# Patient Record
Sex: MALE | Race: ASIAN | Employment: FULL TIME | URBAN - METROPOLITAN AREA
[De-identification: names, ages, dates, MRNs, and addresses within clinical notes are randomized per-mention and may not be internally consistent; named-entity substitution may affect disease eponyms.]

---

## 2017-05-19 PROCEDURE — 87086 URINE CULTURE/COLONY COUNT: CPT | Performed by: FAMILY MEDICINE

## 2017-05-19 PROCEDURE — 81001 URINALYSIS AUTO W/SCOPE: CPT | Performed by: FAMILY MEDICINE

## 2018-03-21 ENCOUNTER — HOSPITAL ENCOUNTER (OUTPATIENT)
Age: 48
Discharge: HOME | End: 2018-03-21
Attending: FAMILY MEDICINE
Payer: COMMERCIAL

## 2018-03-21 VITALS
RESPIRATION RATE: 14 BRPM | BODY MASS INDEX: 26.03 KG/M2 | WEIGHT: 162 LBS | OXYGEN SATURATION: 97 % | HEART RATE: 91 BPM | SYSTOLIC BLOOD PRESSURE: 126 MMHG | HEIGHT: 66 IN | DIASTOLIC BLOOD PRESSURE: 85 MMHG | TEMPERATURE: 98 F

## 2018-03-21 DIAGNOSIS — K52.9 GASTROENTERITIS: Primary | ICD-10-CM

## 2018-03-21 LAB
#LYMPHOCYTE IC: 0.6 X10ˆ3/UL (ref 0.9–3.2)
#MXD IC: 0.6 X10ˆ3/UL (ref 0.1–1)
#NEUTROPHIL IC: 11.6 X10ˆ3/UL (ref 1.3–6.7)
CREAT SERPL-MCNC: 0.8 MG/DL (ref 0.7–1.3)
GLUCOSE BLD-MCNC: 246 MG/DL (ref 65–99)
GLUCOSE BLD-MCNC: 303 MG/DL (ref 70–99)
HCT IC: 45.6 % (ref 37–54)
HGB IC: 15.5 G/DL (ref 13–17)
ISTAT BLOOD GAS TCO2: 27 MMOL/L (ref 22–32)
ISTAT BUN: 20 MG/DL (ref 8–20)
ISTAT CHLORIDE: 100 MMOL/L (ref 101–111)
ISTAT HEMATOCRIT: 48 % (ref 37–53)
ISTAT IONIZED CALCIUM: 1.13 MMOL/L (ref 1.12–1.32)
ISTAT POTASSIUM: 3.6 MMOL/L (ref 3.6–5.1)
ISTAT SODIUM: 141 MMOL/L (ref 136–144)
LYMPHOCYTES NFR BLD AUTO: 4.8 %
MCH IC: 30.5 PG (ref 27–33.2)
MCHC IC: 34 G/DL (ref 31–37)
MCV IC: 89.8 FL (ref 80–99)
MIXED CELL %: 4.3 %
NEUTROPHILS NFR BLD AUTO: 90.9 %
PLT IC: 308 X10ˆ3/UL (ref 150–450)
POCT BILIRUBIN URINE: NEGATIVE
POCT BLOOD URINE: NEGATIVE
POCT GLUCOSE URINE: 500 MG/DL
POCT KETONE URINE: 80 MG/DL
POCT LEUKOCYTE ESTERASE URINE: NEGATIVE
POCT NITRITE URINE: NEGATIVE
POCT PH URINE: 5.5 (ref 5–8)
POCT PROTEIN URINE: 30 MG/DL
POCT SPECIFIC GRAVITY URINE: 1.02
POCT URINE CLARITY: CLEAR
POCT UROBILINOGEN URINE: 0.2 MG/DL
RBC IC: 5.08 X10ˆ6/UL (ref 4.3–5.7)
WBC IC: 12.8 X10ˆ3/UL (ref 4–13)

## 2018-03-21 PROCEDURE — 96360 HYDRATION IV INFUSION INIT: CPT

## 2018-03-21 PROCEDURE — 80047 BASIC METABLC PNL IONIZED CA: CPT

## 2018-03-21 PROCEDURE — 81002 URINALYSIS NONAUTO W/O SCOPE: CPT | Performed by: FAMILY MEDICINE

## 2018-03-21 PROCEDURE — 82962 GLUCOSE BLOOD TEST: CPT

## 2018-03-21 PROCEDURE — 99204 OFFICE O/P NEW MOD 45 MIN: CPT

## 2018-03-21 PROCEDURE — 85025 COMPLETE CBC W/AUTO DIFF WBC: CPT | Performed by: FAMILY MEDICINE

## 2018-03-21 PROCEDURE — 99214 OFFICE O/P EST MOD 30 MIN: CPT

## 2018-03-21 RX ORDER — ONDANSETRON 4 MG/1
4 TABLET, ORALLY DISINTEGRATING ORAL ONCE
Status: COMPLETED | OUTPATIENT
Start: 2018-03-21 | End: 2018-03-21

## 2018-03-21 RX ORDER — ONDANSETRON 4 MG/1
4 TABLET, ORALLY DISINTEGRATING ORAL EVERY 4 HOURS PRN
Qty: 10 TABLET | Refills: 0 | Status: SHIPPED | OUTPATIENT
Start: 2018-03-21 | End: 2018-03-28

## 2018-03-21 RX ORDER — SODIUM CHLORIDE 9 MG/ML
1000 INJECTION, SOLUTION INTRAVENOUS ONCE
Status: COMPLETED | OUTPATIENT
Start: 2018-03-21 | End: 2018-03-21

## 2018-03-21 NOTE — ED PROVIDER NOTES
Patient Seen in: [address]    History   Patient presents with:  Nausea/Vomiting/Diarrhea (gastrointestinal)    Stated Complaint: vomiting;headache;diarrhea     HPI    19-year-old male presents for nausea, vomiting and diar well-nourished. HENT:   Head: Normocephalic and atraumatic.    Right Ear: Tympanic membrane, external ear and ear canal normal.   Left Ear: Tympanic membrane, external ear and ear canal normal.   Nose: Nose normal.   Mouth/Throat: Oropharynx is clear and diagnosis)    Disposition:  Discharge  3/21/2018 11:08 am    Follow-up:  Rufus Castellanos MD  [address] [place] [address] [phone]              Medications Prescribed:  Discharge Medication List as of 3/21/2018 11:10 AM    START taking th

## 2019-01-19 PROCEDURE — 82570 ASSAY OF URINE CREATININE: CPT | Performed by: INTERNAL MEDICINE

## 2019-01-19 PROCEDURE — 82043 UR ALBUMIN QUANTITATIVE: CPT | Performed by: INTERNAL MEDICINE

## 2019-01-19 PROCEDURE — 84681 ASSAY OF C-PEPTIDE: CPT | Performed by: INTERNAL MEDICINE

## 2020-12-29 PROBLEM — M75.102 ROTATOR CUFF SYNDROME OF LEFT SHOULDER: Status: ACTIVE | Noted: 2020-12-29

## 2021-01-27 ENCOUNTER — LAB ENCOUNTER (OUTPATIENT)
Dept: LAB | Facility: HOSPITAL | Age: 51
End: 2021-01-27
Attending: INTERNAL MEDICINE
Payer: COMMERCIAL

## 2021-01-27 ENCOUNTER — OFFICE VISIT (OUTPATIENT)
Dept: ENDOCRINOLOGY CLINIC | Facility: CLINIC | Age: 51
End: 2021-01-27
Payer: COMMERCIAL

## 2021-01-27 VITALS
HEART RATE: 86 BPM | DIASTOLIC BLOOD PRESSURE: 88 MMHG | WEIGHT: 167 LBS | BODY MASS INDEX: 27 KG/M2 | SYSTOLIC BLOOD PRESSURE: 130 MMHG

## 2021-01-27 DIAGNOSIS — E11.9 TYPE 2 DIABETES MELLITUS WITHOUT COMPLICATION, WITHOUT LONG-TERM CURRENT USE OF INSULIN (HCC): ICD-10-CM

## 2021-01-27 DIAGNOSIS — E11.9 TYPE 2 DIABETES MELLITUS WITHOUT COMPLICATION, WITHOUT LONG-TERM CURRENT USE OF INSULIN (HCC): Primary | ICD-10-CM

## 2021-01-27 LAB
ALBUMIN SERPL-MCNC: 3.5 G/DL (ref 3.4–5)
ALBUMIN/GLOB SERPL: 0.8 {RATIO} (ref 1–2)
ALP LIVER SERPL-CCNC: 91 U/L
ALT SERPL-CCNC: 33 U/L
ANION GAP SERPL CALC-SCNC: 6 MMOL/L (ref 0–18)
AST SERPL-CCNC: 14 U/L (ref 15–37)
BILIRUB SERPL-MCNC: 0.8 MG/DL (ref 0.1–2)
BUN BLD-MCNC: 15 MG/DL (ref 7–18)
BUN/CREAT SERPL: 16.5 (ref 10–20)
CALCIUM BLD-MCNC: 9.1 MG/DL (ref 8.5–10.1)
CARTRIDGE LOT#: ABNORMAL NUMERIC
CHLORIDE SERPL-SCNC: 105 MMOL/L (ref 98–112)
CHOLEST SMN-MCNC: 206 MG/DL (ref ?–200)
CO2 SERPL-SCNC: 29 MMOL/L (ref 21–32)
CREAT BLD-MCNC: 0.91 MG/DL
CREAT UR-SCNC: 75.3 MG/DL
GLOBULIN PLAS-MCNC: 4.4 G/DL (ref 2.8–4.4)
GLUCOSE BLD-MCNC: 268 MG/DL (ref 70–99)
GLUCOSE BLOOD: 336
HDLC SERPL-MCNC: 47 MG/DL (ref 40–59)
HEMOGLOBIN A1C: 10.1 % (ref 4.3–5.6)
LDLC SERPL CALC-MCNC: 119 MG/DL (ref ?–100)
M PROTEIN MFR SERPL ELPH: 7.9 G/DL (ref 6.4–8.2)
MICROALBUMIN UR-MCNC: 0.56 MG/DL
MICROALBUMIN/CREAT 24H UR-RTO: 7.4 UG/MG (ref ?–30)
NONHDLC SERPL-MCNC: 159 MG/DL (ref ?–130)
OSMOLALITY SERPL CALC.SUM OF ELEC: 300 MOSM/KG (ref 275–295)
PATIENT FASTING Y/N/NP: NO
PATIENT FASTING Y/N/NP: NO
POTASSIUM SERPL-SCNC: 3.8 MMOL/L (ref 3.5–5.1)
SODIUM SERPL-SCNC: 140 MMOL/L (ref 136–145)
T4 FREE SERPL-MCNC: 1 NG/DL (ref 0.8–1.7)
TEST STRIP LOT #: NORMAL NUMERIC
TRIGL SERPL-MCNC: 200 MG/DL (ref 30–149)
TSI SER-ACNC: 0.5 MIU/ML (ref 0.36–3.74)
VLDLC SERPL CALC-MCNC: 40 MG/DL (ref 0–30)

## 2021-01-27 PROCEDURE — 36415 COLL VENOUS BLD VENIPUNCTURE: CPT

## 2021-01-27 PROCEDURE — 82947 ASSAY GLUCOSE BLOOD QUANT: CPT | Performed by: INTERNAL MEDICINE

## 2021-01-27 PROCEDURE — 80053 COMPREHEN METABOLIC PANEL: CPT

## 2021-01-27 PROCEDURE — 3079F DIAST BP 80-89 MM HG: CPT | Performed by: INTERNAL MEDICINE

## 2021-01-27 PROCEDURE — 3075F SYST BP GE 130 - 139MM HG: CPT | Performed by: INTERNAL MEDICINE

## 2021-01-27 PROCEDURE — 84439 ASSAY OF FREE THYROXINE: CPT

## 2021-01-27 PROCEDURE — 99204 OFFICE O/P NEW MOD 45 MIN: CPT | Performed by: INTERNAL MEDICINE

## 2021-01-27 PROCEDURE — 82570 ASSAY OF URINE CREATININE: CPT

## 2021-01-27 PROCEDURE — 82043 UR ALBUMIN QUANTITATIVE: CPT

## 2021-01-27 PROCEDURE — 83036 HEMOGLOBIN GLYCOSYLATED A1C: CPT | Performed by: INTERNAL MEDICINE

## 2021-01-27 PROCEDURE — 84443 ASSAY THYROID STIM HORMONE: CPT

## 2021-01-27 PROCEDURE — 36416 COLLJ CAPILLARY BLOOD SPEC: CPT | Performed by: INTERNAL MEDICINE

## 2021-01-27 PROCEDURE — 80061 LIPID PANEL: CPT

## 2021-01-27 RX ORDER — METFORMIN HYDROCHLORIDE 500 MG/1
1000 TABLET, EXTENDED RELEASE ORAL 2 TIMES DAILY WITH MEALS
Qty: 120 TABLET | Refills: 1 | Status: ON HOLD | OUTPATIENT
Start: 2021-01-27 | End: 2021-03-01

## 2021-01-27 NOTE — H&P
Name: Chicho Nuñez  Date: 1/27/2021    Referring Physician: referred by his wife    HISTORY OF PRESENT ILLNESS   Chicho Nuñez is a 52year old male who presents for evaluation and management of uncontrolled T2D. He was diagnosed about 6-8 years.  He was margi (BLOOD GLUCOSE TEST) In Vitro Strip, Test once daily, Disp: 100 strip, Rfl: 1  •  Lancets Does not apply Misc, Test once daily, Disp: 100 each, Rfl: 1  •  MetFORMIN HCl 1000 MG Oral Tab, Take 1 tablet (1,000 mg total) by mouth 2 (two) times daily with meal place  Nutritional:  no abnormal weight gain or loss    Lab Data:   Lab Results   Component Value Date     01/19/2019    A1C 10.1 (A) 01/27/2021     Lab Results   Component Value Date     (H) 01/19/2019    BUN 15 01/19/2019    CREATSERUM 0.96 every meal, with lots whole grains, and protein from either plant-based sources, or poultry and fish. Return to clinic in 4 weeks    During the face to face encounter, I spent an additional 20 minutes which were determined for follow-up.  Greater than 5

## 2021-01-28 ENCOUNTER — TELEPHONE (OUTPATIENT)
Dept: ENDOCRINOLOGY CLINIC | Facility: CLINIC | Age: 51
End: 2021-01-28

## 2021-01-28 RX ORDER — ATORVASTATIN CALCIUM 20 MG/1
20 TABLET, FILM COATED ORAL NIGHTLY
Qty: 90 TABLET | Refills: 1 | Status: SHIPPED | OUTPATIENT
Start: 2021-01-28

## 2021-01-28 NOTE — TELEPHONE ENCOUNTER
Hi!  Please call patient and let him know that I have reviewed his blood tests. He does not have any thyroid disease. He does not have any evidence of damage to his kidneys due to the diabetes. I would like to start him on a statin.  I will send a prescript

## 2021-02-25 NOTE — TELEPHONE ENCOUNTER
Second attempt to contact patient. No answer, mailbox full. [employer] message sent and flagged it to alert office if not read by tomorrow 2/26/21. Called pharmacy and spoke to [facility] who confirmed patient picked up atorvastatin on 2/3/21.

## 2021-02-28 ENCOUNTER — HOSPITAL ENCOUNTER (OUTPATIENT)
Age: 51
Discharge: ANOTHER HOSPITAL | End: 2021-02-28
Payer: COMMERCIAL

## 2021-02-28 ENCOUNTER — APPOINTMENT (OUTPATIENT)
Dept: CV DIAGNOSTICS | Facility: HOSPITAL | Age: 51
DRG: 310 | End: 2021-02-28
Attending: INTERNAL MEDICINE
Payer: COMMERCIAL

## 2021-02-28 ENCOUNTER — HOSPITAL ENCOUNTER (INPATIENT)
Facility: HOSPITAL | Age: 51
LOS: 1 days | Discharge: HOME | DRG: 310 | End: 2021-03-01
Attending: EMERGENCY MEDICINE | Admitting: INTERNAL MEDICINE
Payer: COMMERCIAL

## 2021-02-28 ENCOUNTER — APPOINTMENT (OUTPATIENT)
Dept: GENERAL RADIOLOGY | Facility: HOSPITAL | Age: 51
DRG: 310 | End: 2021-02-28
Payer: COMMERCIAL

## 2021-02-28 VITALS
WEIGHT: 160 LBS | SYSTOLIC BLOOD PRESSURE: 125 MMHG | HEART RATE: 150 BPM | TEMPERATURE: 98 F | RESPIRATION RATE: 20 BRPM | DIASTOLIC BLOOD PRESSURE: 75 MMHG | OXYGEN SATURATION: 99 % | BODY MASS INDEX: 26 KG/M2

## 2021-02-28 DIAGNOSIS — R11.2 NAUSEA AND VOMITING IN ADULT: ICD-10-CM

## 2021-02-28 DIAGNOSIS — I48.91 ATRIAL FIBRILLATION WITH RVR (HCC): Primary | ICD-10-CM

## 2021-02-28 DIAGNOSIS — E11.9 TYPE 2 DIABETES MELLITUS WITHOUT COMPLICATION, WITHOUT LONG-TERM CURRENT USE OF INSULIN (HCC): ICD-10-CM

## 2021-02-28 DIAGNOSIS — R53.1 WEAKNESS: ICD-10-CM

## 2021-02-28 DIAGNOSIS — E11.65 TYPE 2 DIABETES MELLITUS WITH HYPERGLYCEMIA, UNSPECIFIED WHETHER LONG TERM INSULIN USE (HCC): ICD-10-CM

## 2021-02-28 DIAGNOSIS — Z20.822 LAB TEST NEGATIVE FOR COVID-19 VIRUS: ICD-10-CM

## 2021-02-28 DIAGNOSIS — R55 SYNCOPE, NEAR: ICD-10-CM

## 2021-02-28 PROBLEM — R73.9 HYPERGLYCEMIA: Status: ACTIVE | Noted: 2021-02-28

## 2021-02-28 LAB
#MXD IC: 0.3 X10ˆ3/UL (ref 0.1–1)
ALBUMIN SERPL-MCNC: 3.6 G/DL (ref 3.4–5)
ALBUMIN/GLOB SERPL: 0.9 {RATIO} (ref 1–2)
ALP LIVER SERPL-CCNC: 88 U/L
ALT SERPL-CCNC: 31 U/L
ANION GAP SERPL CALC-SCNC: 8 MMOL/L (ref 0–18)
APTT PPP: 27.7 SECONDS (ref 25.4–36.1)
AST SERPL-CCNC: 14 U/L (ref 15–37)
ATRIAL RATE: 153 BPM
ATRIAL RATE: 163 BPM
BASOPHILS # BLD AUTO: 0.03 X10(3) UL (ref 0–0.2)
BASOPHILS NFR BLD AUTO: 0.3 %
BILIRUB SERPL-MCNC: 0.9 MG/DL (ref 0.1–2)
BUN BLD-MCNC: 13 MG/DL (ref 7–18)
BUN/CREAT SERPL: 14.6 (ref 10–20)
CALCIUM BLD-MCNC: 9.1 MG/DL (ref 8.5–10.1)
CHLORIDE SERPL-SCNC: 105 MMOL/L (ref 98–112)
CO2 SERPL-SCNC: 27 MMOL/L (ref 21–32)
CREAT BLD-MCNC: 0.89 MG/DL
DEPRECATED RDW RBC AUTO: 37.5 FL (ref 35.1–46.3)
EOSINOPHIL # BLD AUTO: 0 X10(3) UL (ref 0–0.7)
EOSINOPHIL NFR BLD AUTO: 0 %
ERYTHROCYTE [DISTWIDTH] IN BLOOD BY AUTOMATED COUNT: 11.9 % (ref 11–15)
GLOBULIN PLAS-MCNC: 4 G/DL (ref 2.8–4.4)
GLUCOSE BLD-MCNC: 205 MG/DL (ref 70–99)
GLUCOSE BLD-MCNC: 237 MG/DL (ref 70–99)
GLUCOSE BLD-MCNC: 273 MG/DL (ref 70–99)
GLUCOSE BLD-MCNC: 286 MG/DL (ref 70–99)
HAV IGM SER QL: 2.1 MG/DL (ref 1.6–2.6)
HCT VFR BLD AUTO: 44.7 %
HCT VFR BLD AUTO: 46.6 %
HGB BLD-MCNC: 15.1 G/DL
HGB BLD-MCNC: 15.3 G/DL
IMM GRANULOCYTES # BLD AUTO: 0.02 X10(3) UL (ref 0–1)
IMM GRANULOCYTES NFR BLD: 0.2 %
INR BLD: 0.94 (ref 0.89–1.11)
LYMPHOCYTES # BLD AUTO: 1.49 X10(3) UL (ref 1–4)
LYMPHOCYTES # BLD AUTO: 2.2 X10ˆ3/UL (ref 1–4)
LYMPHOCYTES NFR BLD AUTO: 16.4 %
LYMPHOCYTES NFR BLD AUTO: 22 %
M PROTEIN MFR SERPL ELPH: 7.6 G/DL (ref 6.4–8.2)
MCH RBC QN AUTO: 29 PG (ref 26–34)
MCH RBC QN AUTO: 29.7 PG (ref 26–34)
MCHC RBC AUTO-ENTMCNC: 32.4 G/DL (ref 31–37)
MCHC RBC AUTO-ENTMCNC: 34.2 G/DL (ref 31–37)
MCV RBC AUTO: 86.6 FL
MCV RBC AUTO: 89.6 FL (ref 80–100)
MIXED CELL %: 3 %
MONOCYTES # BLD AUTO: 0.31 X10(3) UL (ref 0.1–1)
MONOCYTES NFR BLD AUTO: 3.4 %
NEUTROPHILS # BLD AUTO: 7.21 X10 (3) UL (ref 1.5–7.7)
NEUTROPHILS # BLD AUTO: 7.21 X10(3) UL (ref 1.5–7.7)
NEUTROPHILS # BLD AUTO: 7.5 X10ˆ3/UL (ref 1.5–7.7)
NEUTROPHILS NFR BLD AUTO: 75 %
NEUTROPHILS NFR BLD AUTO: 79.7 %
OSMOLALITY SERPL CALC.SUM OF ELEC: 300 MOSM/KG (ref 275–295)
PLATELET # BLD AUTO: 334 10(3)UL (ref 150–450)
PLATELET # BLD AUTO: 363 X10ˆ3/UL (ref 150–450)
POTASSIUM SERPL-SCNC: 4 MMOL/L (ref 3.5–5.1)
PSA SERPL DL<=0.01 NG/ML-MCNC: 12.9 SECONDS (ref 12.4–14.6)
Q-T INTERVAL: 296 MS
Q-T INTERVAL: 302 MS
QRS DURATION: 86 MS
QRS DURATION: 92 MS
QTC CALCULATION (BEZET): 469 MS
QTC CALCULATION (BEZET): 483 MS
R AXIS: 25 DEGREES
R AXIS: 26 DEGREES
RBC # BLD AUTO: 5.16 X10(6)UL
RBC # BLD AUTO: 5.2 X10ˆ6/UL
SARS-COV-2 RNA RESP QL NAA+PROBE: NOT DETECTED
SODIUM SERPL-SCNC: 140 MMOL/L (ref 136–145)
T AXIS: -84 DEGREES
T AXIS: 246 DEGREES
TROPONIN I SERPL-MCNC: <0.045 NG/ML (ref ?–0.04)
VENTRICULAR RATE: 151 BPM
VENTRICULAR RATE: 154 BPM
WBC # BLD AUTO: 10 X10ˆ3/UL (ref 4–11)
WBC # BLD AUTO: 9.1 X10(3) UL (ref 4–11)

## 2021-02-28 PROCEDURE — 96365 THER/PROPH/DIAG IV INF INIT: CPT

## 2021-02-28 PROCEDURE — 93000 ELECTROCARDIOGRAM COMPLETE: CPT | Performed by: PHYSICIAN ASSISTANT

## 2021-02-28 PROCEDURE — 82962 GLUCOSE BLOOD TEST: CPT

## 2021-02-28 PROCEDURE — 80053 COMPREHEN METABOLIC PANEL: CPT | Performed by: EMERGENCY MEDICINE

## 2021-02-28 PROCEDURE — U0002 COVID-19 LAB TEST NON-CDC: HCPCS | Performed by: PHYSICIAN ASSISTANT

## 2021-02-28 PROCEDURE — 85610 PROTHROMBIN TIME: CPT | Performed by: EMERGENCY MEDICINE

## 2021-02-28 PROCEDURE — 93306 TTE W/DOPPLER COMPLETE: CPT | Performed by: INTERNAL MEDICINE

## 2021-02-28 PROCEDURE — 93010 ELECTROCARDIOGRAM REPORT: CPT

## 2021-02-28 PROCEDURE — 84484 ASSAY OF TROPONIN QUANT: CPT | Performed by: EMERGENCY MEDICINE

## 2021-02-28 PROCEDURE — 36415 COLL VENOUS BLD VENIPUNCTURE: CPT | Performed by: PHYSICIAN ASSISTANT

## 2021-02-28 PROCEDURE — 71045 X-RAY EXAM CHEST 1 VIEW: CPT | Performed by: EMERGENCY MEDICINE

## 2021-02-28 PROCEDURE — 99291 CRITICAL CARE FIRST HOUR: CPT

## 2021-02-28 PROCEDURE — 96374 THER/PROPH/DIAG INJ IV PUSH: CPT | Performed by: PHYSICIAN ASSISTANT

## 2021-02-28 PROCEDURE — 85025 COMPLETE CBC W/AUTO DIFF WBC: CPT | Performed by: PHYSICIAN ASSISTANT

## 2021-02-28 PROCEDURE — 96375 TX/PRO/DX INJ NEW DRUG ADDON: CPT

## 2021-02-28 PROCEDURE — 82962 GLUCOSE BLOOD TEST: CPT | Performed by: PHYSICIAN ASSISTANT

## 2021-02-28 PROCEDURE — 85730 THROMBOPLASTIN TIME PARTIAL: CPT | Performed by: EMERGENCY MEDICINE

## 2021-02-28 PROCEDURE — 96366 THER/PROPH/DIAG IV INF ADDON: CPT

## 2021-02-28 PROCEDURE — 93005 ELECTROCARDIOGRAM TRACING: CPT

## 2021-02-28 PROCEDURE — 83735 ASSAY OF MAGNESIUM: CPT | Performed by: HOSPITALIST

## 2021-02-28 PROCEDURE — 99205 OFFICE O/P NEW HI 60 MIN: CPT | Performed by: PHYSICIAN ASSISTANT

## 2021-02-28 RX ORDER — METOPROLOL TARTRATE 50 MG/1
50 TABLET, FILM COATED ORAL ONCE
Status: COMPLETED | OUTPATIENT
Start: 2021-02-28 | End: 2021-02-28

## 2021-02-28 RX ORDER — ACETAMINOPHEN 325 MG/1
650 TABLET ORAL EVERY 6 HOURS PRN
Status: DISCONTINUED | OUTPATIENT
Start: 2021-02-28 | End: 2021-03-01

## 2021-02-28 RX ORDER — SODIUM CHLORIDE 9 MG/ML
1000 INJECTION, SOLUTION INTRAVENOUS ONCE
Status: COMPLETED | OUTPATIENT
Start: 2021-02-28 | End: 2021-02-28

## 2021-02-28 RX ORDER — METOPROLOL SUCCINATE 50 MG/1
50 TABLET, EXTENDED RELEASE ORAL ONCE
Status: DISCONTINUED | OUTPATIENT
Start: 2021-02-28 | End: 2021-02-28

## 2021-02-28 RX ORDER — ATORVASTATIN CALCIUM 20 MG/1
20 TABLET, FILM COATED ORAL NIGHTLY
Status: DISCONTINUED | OUTPATIENT
Start: 2021-02-28 | End: 2021-03-01

## 2021-02-28 RX ORDER — SODIUM CHLORIDE 9 MG/ML
INJECTION, SOLUTION INTRAVENOUS CONTINUOUS
Status: DISCONTINUED | OUTPATIENT
Start: 2021-02-28 | End: 2021-03-01

## 2021-02-28 RX ORDER — ONDANSETRON 2 MG/ML
4 INJECTION INTRAMUSCULAR; INTRAVENOUS ONCE
Status: COMPLETED | OUTPATIENT
Start: 2021-02-28 | End: 2021-02-28

## 2021-02-28 RX ORDER — MELATONIN
3 NIGHTLY
Status: DISCONTINUED | OUTPATIENT
Start: 2021-02-28 | End: 2021-03-01

## 2021-02-28 RX ORDER — METOPROLOL TARTRATE 5 MG/5ML
5 INJECTION INTRAVENOUS ONCE
Status: COMPLETED | OUTPATIENT
Start: 2021-02-28 | End: 2021-02-28

## 2021-02-28 NOTE — ED INITIAL ASSESSMENT (HPI)
Pt is here for vomiting and diarrhea with syncope since yesterday. Pt denies any chest pain or shortness of breath. Pt denies any abd pain or back pain.  Pt is currently vomiting in the IC on arrival.

## 2021-02-28 NOTE — ED PROVIDER NOTES
Patient Seen in: Immediate [address] [place] [address]      History   Patient presents with:  Nausea/Vomiting/Diarrhea  Syncope    Stated Complaint: nauseous, no appetite, passed out yesterday x 2 days    HPI/Subjective:   HPI    82-year-old male with a histor Current:/75   Pulse (!) 150   Temp 98 °F (36.7 °C)   Resp 20   Wt 72.6 kg   SpO2 99%   BMI 25.82 kg/m²         Physical Exam  Vitals signs and nursing note reviewed. Constitutional:       General: He is in acute distress.       Appearance: He MDM      Discussed with and evaluated the patient with Dr. Carin Rice who agrees with assessment and plan.     Newly brought back to the room IV line established, patient is noted to be in A. fib with RVR with a heart rate in the upper 160s, placed on cardi

## 2021-02-28 NOTE — CONSULTS
[facility] Cardiology Consultation    Wade Rendon Patient Status:  Emergency    1970 MRN [id number]   Location [address] Attending Maile Severino MD   Hosp Day # 0 PCP Merle Gallego MD     Reason for Consultation:  Afib with RVR Weights  02/28/21 0925 : 160 lb (72.6 kg)  02/28/21 0857 : 160 lb (72.6 kg)  02/23/21 1530 : 162 lb 12.8 oz (73.8 kg)          General: No apparent distress, Awake, Alert  HEENT: No focal deficits. Anicteric, NACAT  Neck: supple.  JVP normal  Cardiac: Irreg,

## 2021-02-28 NOTE — ED PROVIDER NOTES
Patient Seen in: [facility] Emergency Department      History   Patient presents with:  Arrythmia/Palpitations    Stated Complaint: new afib    HPI/Subjective:   HPI    Lightheadedness, an episode of near syncope, palpitations, all started around 1: Normocephalic and atraumatic. Eyes: EOM are normal. Pupils are equal, round, and reactive to light. Neck: Normal range of motion. Neck supple. No JVD present.      Cardiovascular: Very tachycardic, irregularly irregular but adequate peripheral perfusi Slight hyperglycemia. Cardizem drip was seemingly ineffective, I did give him 5 metoprolol which did lower the heart rate somewhat.      Discussed with Dr. Madison Cruz, will see the patient and possibly order some oral beta-blocker, will hold off on additio

## 2021-02-28 NOTE — ED NOTES
Pt to be transferred to [facility] ER for further care by 911 ambulance. Family at the bedside.

## 2021-02-28 NOTE — PROGRESS NOTES
NURSING ADMISSION NOTE      Patient admitted via Cart  Oriented to room. Safety precautions initiated. Bed in low position. Call light in reach. Pt admitted from ER, arrived on cart and ambulated to bed. C/o light headedness and dizziness.  Tele

## 2021-02-28 NOTE — ED INITIAL ASSESSMENT (HPI)
Patient presents from immediate care for further evaluation of new onset afib. Patient reports that yesterday he started feeling nauseated and vomited. Felt near syncopal and went to [facility] [place] today. His heart rate is 130-140s.  Denies chest pain, states he doesn'

## 2021-02-28 NOTE — H&P
[facility] Hospitalist History and Physical      Patient presents with:  Arrythmia/Palpitations       PCP: Azeb Murry MD      History of Present Illness: Patient is a 54year old male with PMH sig for HTN, HLD, and DM II who presented to the ED for further ev (30 mg total) by mouth daily. , Disp: 90 tablet, Rfl: 1    •  Blood Glucose Monitoring Suppl (BLOOD GLUCOSE MONITOR SYSTEM) W/DEVICE Does not apply Kit, Test once daily, Disp: 1 kit, Rfl: 1    •  Glucose Blood (BLOOD GLUCOSE TEST) In Vitro Strip, Test once personally reviewed. Radiology: Xr Chest Ap Portable  (cpt=71045)    Result Date: 2/28/2021  PROCEDURE:  XR CHEST AP PORTABLE  (CPT=71045)  TECHNIQUE:  AP chest radiograph was obtained. COMPARISON:  None.   INDICATIONS:  new afib  PATIENT STATED HISTO

## 2021-03-01 ENCOUNTER — APPOINTMENT (OUTPATIENT)
Dept: CT IMAGING | Facility: HOSPITAL | Age: 51
DRG: 310 | End: 2021-03-01
Attending: INTERNAL MEDICINE
Payer: COMMERCIAL

## 2021-03-01 VITALS
DIASTOLIC BLOOD PRESSURE: 78 MMHG | RESPIRATION RATE: 16 BRPM | WEIGHT: 159.5 LBS | HEART RATE: 80 BPM | TEMPERATURE: 99 F | BODY MASS INDEX: 25.63 KG/M2 | OXYGEN SATURATION: 96 % | SYSTOLIC BLOOD PRESSURE: 118 MMHG | HEIGHT: 66 IN

## 2021-03-01 LAB
ANION GAP SERPL CALC-SCNC: 5 MMOL/L (ref 0–18)
ATRIAL RATE: 72 BPM
BUN BLD-MCNC: 13 MG/DL (ref 7–18)
BUN/CREAT SERPL: 16.9 (ref 10–20)
CALCIUM BLD-MCNC: 8.6 MG/DL (ref 8.5–10.1)
CHLORIDE SERPL-SCNC: 112 MMOL/L (ref 98–112)
CO2 SERPL-SCNC: 26 MMOL/L (ref 21–32)
CREAT BLD-MCNC: 0.77 MG/DL
GLUCOSE BLD-MCNC: 196 MG/DL (ref 70–99)
GLUCOSE BLD-MCNC: 214 MG/DL (ref 70–99)
GLUCOSE BLD-MCNC: 219 MG/DL (ref 70–99)
OSMOLALITY SERPL CALC.SUM OF ELEC: 303 MOSM/KG (ref 275–295)
P AXIS: 27 DEGREES
P-R INTERVAL: 136 MS
POTASSIUM SERPL-SCNC: 3.7 MMOL/L (ref 3.5–5.1)
Q-T INTERVAL: 376 MS
QRS DURATION: 88 MS
QTC CALCULATION (BEZET): 411 MS
R AXIS: 4 DEGREES
SODIUM SERPL-SCNC: 143 MMOL/L (ref 136–145)
T AXIS: 8 DEGREES
VENTRICULAR RATE: 72 BPM

## 2021-03-01 PROCEDURE — 82962 GLUCOSE BLOOD TEST: CPT

## 2021-03-01 PROCEDURE — 93005 ELECTROCARDIOGRAM TRACING: CPT

## 2021-03-01 PROCEDURE — 71275 CT ANGIOGRAPHY CHEST: CPT | Performed by: INTERNAL MEDICINE

## 2021-03-01 PROCEDURE — 80048 BASIC METABOLIC PNL TOTAL CA: CPT | Performed by: INTERNAL MEDICINE

## 2021-03-01 PROCEDURE — 93010 ELECTROCARDIOGRAM REPORT: CPT | Performed by: INTERNAL MEDICINE

## 2021-03-01 RX ORDER — ASPIRIN 81 MG/1
81 TABLET ORAL DAILY
Qty: 90 TABLET | Refills: 3 | Status: SHIPPED | OUTPATIENT
Start: 2021-03-01

## 2021-03-01 RX ORDER — METOPROLOL SUCCINATE 25 MG/1
25 TABLET, EXTENDED RELEASE ORAL
Qty: 30 TABLET | Refills: 1 | Status: SHIPPED | OUTPATIENT
Start: 2021-03-02 | End: 2021-03-30

## 2021-03-01 RX ORDER — METFORMIN HYDROCHLORIDE 500 MG/1
1000 TABLET, EXTENDED RELEASE ORAL 2 TIMES DAILY WITH MEALS
Qty: 120 TABLET | Refills: 1 | Status: SHIPPED | COMMUNITY
Start: 2021-03-04

## 2021-03-01 RX ORDER — METOPROLOL SUCCINATE 25 MG/1
25 TABLET, EXTENDED RELEASE ORAL
Status: DISCONTINUED | OUTPATIENT
Start: 2021-03-01 | End: 2021-03-01

## 2021-03-01 NOTE — PLAN OF CARE
Assumed care of patient at 1. Patient resting in bed, AOX4. Oxygenating >90% on RA. A-fib on tele. No anti-coag, SCDs in place. Cardizem gtt infusing per protocol.  Blood pressures running low, adjusting Cardizem prn, PO fluids encouraged, IVF infusing p Progressing  2/28/2021 2302 by Tami Russo RN  Outcome: Progressing     Problem: CARDIOVASCULAR - ADULT  Goal: Maintains optimal cardiac output and hemodynamic stability  Description: INTERVENTIONS:  - Monitor vital signs, rhythm, and trends  - Mon

## 2021-03-01 NOTE — DISCHARGE SUMMARY
General Medicine Discharge Summary     Patient ID:  Naldo Blancas  54year old  1/29/1970    Admit date: 2/28/2021    Discharge date and time: 3/1/2021    Attending Physician: DO MIRIAN Groves total) by mouth nightly. metFORMIN HCl  MG Oral Tablet 24 Hr  Take 2 tablets (1,000 mg total) by mouth 2 (two) times daily with meals.   (hold for 48 hour s/p CTA, can resume on 3/3/21 PM)    Meloxicam 15 MG Oral Tab  Take 1 tablet (15 mg total) by

## 2021-03-01 NOTE — PROGRESS NOTES
03/01/21 0525 03/01/21 0528 03/01/21 0531   Vital Signs   /71 (!) 86/65 98/65   MAP (mmHg) 81 70 72   BP Location Left arm Left arm Left arm   BP Method Automatic Automatic Automatic   Patient Position Lying Sitting Standing

## 2021-03-01 NOTE — PROGRESS NOTES
[facility] Cardiology  Progress Note    Deborra Los Patient Status:  Inpatient    1970 MRN [id number]   [facility] 8NE-A Attending Luisito Birmingham, DO   Hosp Day # 1 PCP Bradly Mcpherson MD     Subjective:   Feels better th rales, rhonchi, or wheezes. Good air movement is noted throughout both lung fields. Abdomen: The abdomen is soft, non-distended, and non-tender. Bowel sounds are present and normoactive. No organomegaly is appreciated.   Extremities:  Extremities do n time  4. Plan discharge home today if above negative and clinically stable. 5.  14 day patch monitor upon discharge and F/U in 3 weeks.     Isaiah Frederick MD  3/1/2021  10:05 AM

## 2021-03-01 NOTE — PLAN OF CARE
Patient was given discharge instructions in regards to medications, s/s to monitor for, follow up appointments, and activity level. All questions were answered. IV was removed. Patient was removed from tele. Patient was escorted off the unit by transport. vital signs, rhythm, and trends  - Monitor for bleeding, hypotension and signs of decreased cardiac output  - Evaluate effectiveness of vasoactive medications to optimize hemodynamic stability  - Monitor arterial and/or venous puncture sites for bleeding a

## 2021-03-01 NOTE — PLAN OF CARE
Patient is alert and oriented x4. NSR on tele. Oxygenation is 100% on RA. L sounds clear. VSS. Patient denies chest pain, shortness of breath, palpitations. Denies pain. Patient is resting comfortably in bed at this time.     POC: monitor heart rate, discha Progressing  3/1/2021 0926 by Radha Merchant RN  Outcome: Progressing  Goal: Absence of cardiac arrhythmias or at baseline  Description: INTERVENTIONS:  - Continuous cardiac monitoring, monitor vital signs, obtain 12 lead EKG if indicated  - Evaluate effe

## 2021-03-02 ENCOUNTER — PATIENT OUTREACH (OUTPATIENT)
Dept: CASE MANAGEMENT | Age: 51
End: 2021-03-02

## 2021-03-02 NOTE — PAYOR COMM NOTE
--------------  ADMISSION REVIEW     Payor: [address]  Subscriber #:  [id number]  Authorization Number: [id number]    Admit date: 2/28/21  Admit time: [phone]       Admitting Physician: Jack Mitchell DO  Attending Physician:  Lauren Carmenwell Mail Alcohol/week: 0.0 standard drinks    Drug use: No             Review of Systems    Positive for stated complaint: new afib  Other systems are as noted in HPI. Constitutional and vital signs reviewed.       All other systems reviewed and negative except ---------                               -----------         ------                     CBC W/ DIFFERENTIAL[[id number]]                              Final result                 Please view results for these tests on the individual orders.    MAGNESIUM   RAIN 2/28/2021 10:10 am    Follow-up:  No follow-up provider specified.         Medications Prescribed:  Current Discharge Medication List       Present on Admission  Date Reviewed: 2/28/2021          ICD-10-CM Noted POA    * (Principal) Atrial fibrillation with No current facility-administered medications on file prior to encounter.      •  atorvastatin 20 MG Oral Tab, Take 1 tablet (20 mg total) by mouth nightly., Disp: 90 tablet, Rfl: 1    •  metFORMIN HCl  MG Oral Tablet 24 Hr, Take 2 tablets (1,000 mg to MSK: Full range of motion in extremities, no clubbing, no cyanosis  Skin: no rashes or lesions  Neuro:  Grossly intact, no focal deficits      Data Review:    LABS:   Lab Results   Component Value Date    WBC 9.1 02/28/2021    HGB 15.3 02/28/2021    HCT 44 # HLD  - cont statin    # Essential HTN  - hold losartan given low BP on cardizem     DVT prophy  - SCD (start AC per cardiology)  Dispo: inpt care. Plan of care d/w pt who agrees. Outpatient records or previous hospital records reviewed.    [facility] hosp Diagnosis Date   • Balanitis     • Constipation     • Hemorrhoids     • Other and unspecified hyperlipidemia     • Type II or unspecified type diabetes mellitus without mention of complication, not stated as uncontrolled              Past Surgical History: 3. HL              Recommend:  Afib with RVR  -discussed new diagnosis and rate vs rhythm control  -[id number] of 1. Discussed thromboembolic risk and recommendation for anticoagulation  -Start IV Cardizem  -Given po metoprolol.  Cont lopressor BID as BP 02/28/21 : 160 lb (72.6 kg)  02/23/21 : 162 lb 12.8 oz (73.8 kg)        Allergies:  No Known Allergies     Physical Exam:   General:  Well-developed / Well-nourished. No acute distress. HEENT:  Normocephalic. Atraumatic. No icterus.   Neck:  There is no    Systolic function was normal. The estimated ejection fraction was 60-65%.      There was no diagnostic evidence for regional wall motion abnormalities.      The study is not technically sufficient to allow evaluation of LV      diastolic function.    2.

## 2021-03-02 NOTE — PROGRESS NOTES
1st attempt DM/Endo apt request    Tanner Mckenzie 16  [address] [place] [address], [address] [place] [address] [phone]    Unable to contact patient. Will try again.

## 2021-03-03 NOTE — PAYOR COMM NOTE
--------------  DISCHARGE REVIEW    Payor: [address]  Subscriber #:  [id number]  Authorization Number: [id number]    Admit date: 2/28/21  Admit time:  9360  Discharge Date: 3/1/2021  2:45 PM     Admitting Physician: Nayla Ingram DO  At (resume on discharge, although metformin will need to be held for 48 hour s/p CTA)  - start ISS with accuchecks   - will need close endo f/u, may need to start insulin as outpt      # HLD  - cont statin     # Essential HTN  - hold losartan for now  - BB ad 30 minutes    Paul Client DO  [facility]      Electronically signed by Mikala Xie DO on 3/1/2021  1:34 PM         REVIEWER COMMENTS

## 2021-03-04 NOTE — PROGRESS NOTES
2nd attempt DM/Endo apt request     Tanner Mckenzie 16    [address], [address] [place] [address] [phone]     Unable to contact patient. Will try again.

## 2021-03-05 NOTE — PROGRESS NOTES
3rd attempt DM/Endo apt request     SundayRolando Jonah 16     [address], [address] [facility] [phone]    Unable to contact patient after multiple attempts. No apt made. Closing encounter.

## 2021-03-09 NOTE — TELEPHONE ENCOUNTER
PT read [facility] message on 2/28/21    RN sent reminder to book f/u w/ SY

## 2021-03-30 PROBLEM — I10 ESSENTIAL HYPERTENSION: Status: ACTIVE | Noted: 2021-03-30

## 2021-08-27 ENCOUNTER — HOSPITAL ENCOUNTER (OUTPATIENT)
Dept: CV DIAGNOSTICS | Facility: HOSPITAL | Age: 51
Discharge: HOME | End: 2021-08-27
Attending: INTERNAL MEDICINE
Payer: COMMERCIAL

## 2021-08-27 DIAGNOSIS — I48.0 PAROXYSMAL ATRIAL FIBRILLATION (HCC): ICD-10-CM

## 2021-08-27 PROCEDURE — 93270 REMOTE 30 DAY ECG REV/REPORT: CPT | Performed by: INTERNAL MEDICINE

## 2021-08-27 PROCEDURE — 93272 ECG/REVIEW INTERPRET ONLY: CPT | Performed by: INTERNAL MEDICINE

## 2021-08-27 PROCEDURE — 93271 ECG/MONITORING AND ANALYSIS: CPT | Performed by: INTERNAL MEDICINE

## 2021-09-03 ENCOUNTER — LAB ENCOUNTER (OUTPATIENT)
Dept: LAB | Facility: HOSPITAL | Age: 51
End: 2021-09-03
Attending: INTERNAL MEDICINE
Payer: COMMERCIAL

## 2021-09-03 DIAGNOSIS — Z11.59 SCREENING FOR VIRAL DISEASE: ICD-10-CM

## 2021-09-03 DIAGNOSIS — Z01.818 PREOP EXAMINATION: ICD-10-CM

## 2021-12-06 ENCOUNTER — OFFICE VISIT (OUTPATIENT)
Dept: SURGERY | Facility: CLINIC | Age: 51
End: 2021-12-06
Payer: COMMERCIAL

## 2021-12-06 VITALS — DIASTOLIC BLOOD PRESSURE: 94 MMHG | HEART RATE: 79 BPM | TEMPERATURE: 97 F | SYSTOLIC BLOOD PRESSURE: 131 MMHG

## 2021-12-06 DIAGNOSIS — L29.0 PRURITUS ANI: ICD-10-CM

## 2021-12-06 DIAGNOSIS — K64.2 PROLAPSED INTERNAL HEMORRHOIDS, GRADE 3: Primary | ICD-10-CM

## 2021-12-06 DIAGNOSIS — K60.2 ANAL FISSURE: ICD-10-CM

## 2021-12-06 PROCEDURE — 3080F DIAST BP >= 90 MM HG: CPT | Performed by: COLON & RECTAL SURGERY

## 2021-12-06 PROCEDURE — 3075F SYST BP GE 130 - 139MM HG: CPT | Performed by: COLON & RECTAL SURGERY

## 2021-12-06 PROCEDURE — 99243 OFF/OP CNSLTJ NEW/EST LOW 30: CPT | Performed by: COLON & RECTAL SURGERY

## 2021-12-06 PROCEDURE — 46600 DIAGNOSTIC ANOSCOPY SPX: CPT | Performed by: COLON & RECTAL SURGERY

## 2021-12-06 NOTE — H&P
New Patient Visit Note       Active Problems      1. Prolapsed internal hemorrhoids, grade 3    2. Anal fissure    3.  Pruritus ani        Chief Complaint   Patient presents with:  Hemorrhoids:  had scop[e 2 years ago and was told had internal & external he making. Karlos Chi PA-C    I agree with the note as scribed by the PA  I performed my own physical exam and obtained the history as detailed above.   I have made all appropriate changes in documentation as necessary  I attest to the accuracy of thi HCl  MG Oral Tablet 24 Hr, Take 2 tablets (1,000 mg total) by mouth 2 (two) times daily with meals. , Disp: 120 tablet, Rfl: 1  •  atorvastatin 20 MG Oral Tab, Take 1 tablet (20 mg total) by mouth nightly., Disp: 90 tablet, Rfl: 1  •  SITagliptin Phos Genitourinary:     Prostate: Not enlarged and not tender. Rectum: Anal fissure and internal hemorrhoid present. No mass, tenderness or external hemorrhoid. Normal anal tone.       Comments: No Prostate Nodule  Anal Sphincter Intact  No Lichenificatio pain can get to a 2/10. However, the bleeding and is his primary complaint. He also notes that he feels that the hemorrhoids are also causing his stool to be slightly narrowed. He states that he has regular, soft bowel movements.   He denies any const large grade 3 internal hemorrhoids. These would be amendable to rubber band treatments. We will be scheduling patient undergo for rubber band treatments at least 2 weeks apart from each other.     With the treatment of the hemorrhoids as well as the pruri

## 2021-12-06 NOTE — PATIENT INSTRUCTIONS
The patient presents today in consultation for issues with hemorrhoids. The patient states that he has had issues with hemorrhoids for many years. He states it has been greater than 10 years that he has been having issues with hemorrhoids.   He states t also discussed with the patient at today's visit that they have evidence of pruritus ani skin changes, and this is causing the irritation and itching that they are feeling perianally. I advised the patient on dietary and hygienic modifications.   These inc

## 2021-12-09 ENCOUNTER — OFFICE VISIT (OUTPATIENT)
Dept: SURGERY | Facility: CLINIC | Age: 51
End: 2021-12-09
Payer: COMMERCIAL

## 2021-12-09 VITALS
HEART RATE: 98 BPM | SYSTOLIC BLOOD PRESSURE: 137 MMHG | DIASTOLIC BLOOD PRESSURE: 95 MMHG | TEMPERATURE: 97 F | WEIGHT: 159.81 LBS | HEIGHT: 66 IN | BODY MASS INDEX: 25.68 KG/M2

## 2021-12-09 DIAGNOSIS — K64.2 PROLAPSED INTERNAL HEMORRHOIDS, GRADE 3: Primary | ICD-10-CM

## 2021-12-09 PROCEDURE — 46221 LIGATION OF HEMORRHOID(S): CPT | Performed by: COLON & RECTAL SURGERY

## 2021-12-09 PROCEDURE — 3080F DIAST BP >= 90 MM HG: CPT | Performed by: COLON & RECTAL SURGERY

## 2021-12-09 PROCEDURE — 3075F SYST BP GE 130 - 139MM HG: CPT | Performed by: COLON & RECTAL SURGERY

## 2021-12-09 PROCEDURE — 3008F BODY MASS INDEX DOCD: CPT | Performed by: COLON & RECTAL SURGERY

## 2021-12-09 NOTE — PROGRESS NOTES
Follow Up Visit Note       Active Problems      1. Prolapsed internal hemorrhoids, grade 3          Chief Complaint   Patient presents with:  Hemorrhoid Bandinst banding- Pt. states little rectal bleeding but denies pain. Pt. states having normal BMs. Constitutional: Negative for chills, diaphoresis, fatigue, fever and unexpected weight change. HENT: Negative for hearing loss, nosebleeds, sore throat and trouble swallowing. Respiratory: Negative for apnea, cough, shortness of breath and wheezing.

## 2021-12-10 NOTE — PROCEDURES
Pre op diagnosis: Internal Hemorrhoids    Post op diagnosis: Same    Procedure: Anoscopy with O-ring Rubber Band Ligation of Internal Hemorrhoids    Surgeon: Kenny Villarreal MD    History of present illness:    This patient has internal hemorrhoids that are s

## 2021-12-10 NOTE — PATIENT INSTRUCTIONS
At today's office visit we treated right robi-lateral internal hemorrhoid. At today's visit, the patient underwent an uncomplicated application of a rubber band and injection of a 5% phenol solution into the base of the rubberbanded hemorrhoid.     The

## 2021-12-23 ENCOUNTER — OFFICE VISIT (OUTPATIENT)
Dept: SURGERY | Facility: CLINIC | Age: 51
End: 2021-12-23
Payer: COMMERCIAL

## 2021-12-23 VITALS — HEART RATE: 87 BPM | SYSTOLIC BLOOD PRESSURE: 139 MMHG | TEMPERATURE: 98 F | DIASTOLIC BLOOD PRESSURE: 92 MMHG

## 2021-12-23 DIAGNOSIS — K64.2 PROLAPSED INTERNAL HEMORRHOIDS, GRADE 3: Primary | ICD-10-CM

## 2021-12-23 PROCEDURE — 3080F DIAST BP >= 90 MM HG: CPT | Performed by: COLON & RECTAL SURGERY

## 2021-12-23 PROCEDURE — 46221 LIGATION OF HEMORRHOID(S): CPT | Performed by: COLON & RECTAL SURGERY

## 2021-12-23 PROCEDURE — 3075F SYST BP GE 130 - 139MM HG: CPT | Performed by: COLON & RECTAL SURGERY

## 2021-12-23 NOTE — PROGRESS NOTES
Follow Up Visit Note       Active Problems      1. Prolapsed internal hemorrhoids, grade 3          Chief Complaint   Patient presents with:  Hemorrhoid Bandinnd banding - States 1st one was slightly painful, but doing better now.  States still rectal b Systems   Constitutional: Negative for chills, diaphoresis, fatigue, fever and unexpected weight change. HENT: Negative for hearing loss, nosebleeds, sore throat and trouble swallowing.     Respiratory: Negative for apnea, cough, shortness of breath and w

## 2022-01-06 ENCOUNTER — OFFICE VISIT (OUTPATIENT)
Dept: SURGERY | Facility: CLINIC | Age: 52
End: 2022-01-06
Payer: COMMERCIAL

## 2022-01-06 VITALS
DIASTOLIC BLOOD PRESSURE: 83 MMHG | WEIGHT: 155 LBS | SYSTOLIC BLOOD PRESSURE: 122 MMHG | HEART RATE: 79 BPM | HEIGHT: 66 IN | TEMPERATURE: 97 F | BODY MASS INDEX: 24.91 KG/M2

## 2022-01-06 DIAGNOSIS — K64.2 PROLAPSED INTERNAL HEMORRHOIDS, GRADE 3: Primary | ICD-10-CM

## 2022-01-06 PROCEDURE — 46221 LIGATION OF HEMORRHOID(S): CPT | Performed by: COLON & RECTAL SURGERY

## 2022-01-06 PROCEDURE — 3079F DIAST BP 80-89 MM HG: CPT | Performed by: COLON & RECTAL SURGERY

## 2022-01-06 PROCEDURE — 3074F SYST BP LT 130 MM HG: CPT | Performed by: COLON & RECTAL SURGERY

## 2022-01-06 PROCEDURE — 3008F BODY MASS INDEX DOCD: CPT | Performed by: COLON & RECTAL SURGERY

## 2022-01-06 NOTE — PROGRESS NOTES
Follow Up Visit Note       Active Problems      1. Prolapsed internal hemorrhoids, grade 3          Chief Complaint   Patient presents with:  Hemorrhoid Banding: EP- 3rd banding- Pt. staes small rectal bleeding. Pt. denies any n/v, fevers or chills. Constitutional: Negative for chills, diaphoresis, fatigue, fever and unexpected weight change. HENT: Negative for hearing loss, nosebleeds, sore throat and trouble swallowing. Respiratory: Negative for apnea, cough, shortness of breath and wheezing.

## 2022-04-18 ENCOUNTER — APPOINTMENT (OUTPATIENT)
Dept: CT IMAGING | Facility: HOSPITAL | Age: 52
End: 2022-04-18
Attending: EMERGENCY MEDICINE
Payer: COMMERCIAL

## 2022-04-18 ENCOUNTER — HOSPITAL ENCOUNTER (EMERGENCY)
Facility: HOSPITAL | Age: 52
Discharge: LEFT BEFORE DISCHARGE | End: 2022-04-18
Attending: EMERGENCY MEDICINE
Payer: COMMERCIAL

## 2022-04-18 ENCOUNTER — APPOINTMENT (OUTPATIENT)
Dept: MRI IMAGING | Facility: HOSPITAL | Age: 52
End: 2022-04-18
Attending: EMERGENCY MEDICINE
Payer: COMMERCIAL

## 2022-04-18 VITALS
OXYGEN SATURATION: 97 % | DIASTOLIC BLOOD PRESSURE: 85 MMHG | TEMPERATURE: 98 F | SYSTOLIC BLOOD PRESSURE: 126 MMHG | RESPIRATION RATE: 18 BRPM | HEART RATE: 77 BPM

## 2022-04-18 DIAGNOSIS — G51.0 BELL'S PALSY: Primary | ICD-10-CM

## 2022-04-18 DIAGNOSIS — Z53.29 LEFT AGAINST MEDICAL ADVICE: ICD-10-CM

## 2022-04-18 LAB
ALBUMIN SERPL-MCNC: 3.7 G/DL (ref 3.4–5)
ALBUMIN/GLOB SERPL: 0.9 {RATIO} (ref 1–2)
ALP LIVER SERPL-CCNC: 94 U/L
ALT SERPL-CCNC: 38 U/L
ANION GAP SERPL CALC-SCNC: 3 MMOL/L (ref 0–18)
AST SERPL-CCNC: 26 U/L (ref 15–37)
BASOPHILS # BLD AUTO: 0.03 X10(3) UL (ref 0–0.2)
BASOPHILS NFR BLD AUTO: 0.5 %
BILIRUB SERPL-MCNC: 0.8 MG/DL (ref 0.1–2)
BUN BLD-MCNC: 22 MG/DL (ref 7–18)
CALCIUM BLD-MCNC: 8.8 MG/DL (ref 8.5–10.1)
CHLORIDE SERPL-SCNC: 106 MMOL/L (ref 98–112)
CO2 SERPL-SCNC: 26 MMOL/L (ref 21–32)
CREAT BLD-MCNC: 0.95 MG/DL
CRP SERPL-MCNC: <0.29 MG/DL (ref ?–0.3)
EOSINOPHIL # BLD AUTO: 0.08 X10(3) UL (ref 0–0.7)
EOSINOPHIL NFR BLD AUTO: 1.3 %
ERYTHROCYTE [DISTWIDTH] IN BLOOD BY AUTOMATED COUNT: 12.1 %
ERYTHROCYTE [SEDIMENTATION RATE] IN BLOOD: 24 MM/HR
GLOBULIN PLAS-MCNC: 4.1 G/DL (ref 2.8–4.4)
GLUCOSE BLD-MCNC: 363 MG/DL (ref 70–99)
HCT VFR BLD AUTO: 45.7 %
HGB BLD-MCNC: 15.4 G/DL
IMM GRANULOCYTES # BLD AUTO: 0.01 X10(3) UL (ref 0–1)
IMM GRANULOCYTES NFR BLD: 0.2 %
LYMPHOCYTES # BLD AUTO: 2.22 X10(3) UL (ref 1–4)
LYMPHOCYTES NFR BLD AUTO: 35.4 %
MCH RBC QN AUTO: 29.8 PG (ref 26–34)
MCHC RBC AUTO-ENTMCNC: 33.7 G/DL (ref 31–37)
MCV RBC AUTO: 88.6 FL
MONOCYTES # BLD AUTO: 0.54 X10(3) UL (ref 0.1–1)
MONOCYTES NFR BLD AUTO: 8.6 %
NEUTROPHILS # BLD AUTO: 3.4 X10 (3) UL (ref 1.5–7.7)
NEUTROPHILS # BLD AUTO: 3.4 X10(3) UL (ref 1.5–7.7)
NEUTROPHILS NFR BLD AUTO: 54 %
OSMOLALITY SERPL CALC.SUM OF ELEC: 298 MOSM/KG (ref 275–295)
PLATELET # BLD AUTO: 331 10(3)UL (ref 150–450)
POTASSIUM SERPL-SCNC: 4.2 MMOL/L (ref 3.5–5.1)
PROT SERPL-MCNC: 7.8 G/DL (ref 6.4–8.2)
RBC # BLD AUTO: 5.16 X10(6)UL
SODIUM SERPL-SCNC: 135 MMOL/L (ref 136–145)
WBC # BLD AUTO: 6.3 X10(3) UL (ref 4–11)

## 2022-04-18 PROCEDURE — 85652 RBC SED RATE AUTOMATED: CPT | Performed by: EMERGENCY MEDICINE

## 2022-04-18 PROCEDURE — 85025 COMPLETE CBC W/AUTO DIFF WBC: CPT | Performed by: EMERGENCY MEDICINE

## 2022-04-18 PROCEDURE — 86140 C-REACTIVE PROTEIN: CPT | Performed by: EMERGENCY MEDICINE

## 2022-04-18 PROCEDURE — 36415 COLL VENOUS BLD VENIPUNCTURE: CPT

## 2022-04-18 PROCEDURE — 70551 MRI BRAIN STEM W/O DYE: CPT | Performed by: EMERGENCY MEDICINE

## 2022-04-18 PROCEDURE — 80053 COMPREHEN METABOLIC PANEL: CPT | Performed by: EMERGENCY MEDICINE

## 2022-04-18 PROCEDURE — 99285 EMERGENCY DEPT VISIT HI MDM: CPT

## 2022-04-18 PROCEDURE — 99284 EMERGENCY DEPT VISIT MOD MDM: CPT

## 2022-04-18 RX ORDER — VALACYCLOVIR HYDROCHLORIDE 1 G/1
1000 TABLET, FILM COATED ORAL 3 TIMES DAILY
Qty: 21 TABLET | Refills: 0 | Status: SHIPPED | OUTPATIENT
Start: 2022-04-18 | End: 2022-04-25

## 2022-04-18 RX ORDER — AMOXICILLIN AND CLAVULANATE POTASSIUM 875; 125 MG/1; MG/1
1 TABLET, FILM COATED ORAL 2 TIMES DAILY
Qty: 14 TABLET | Refills: 0 | Status: SHIPPED | OUTPATIENT
Start: 2022-04-18 | End: 2022-04-25

## 2022-04-18 NOTE — ED INITIAL ASSESSMENT (HPI)
A&Ox3 patient p/w c/o facial droop    Patient reports \"L facial droop since last Tuesday, Wednesday started developing a HA, Thursday had some dizziness and Friday had R eye pain\"    Patient denies hx of CVA/TIA or Bell's Palsy     No drift, no unilateral weakness, speech is clear, ambulatory w/ steady gait, denies dizziness at this time    Denies any cp/sob/n/v/d/c/f/LH/vision changes/urinary sx at this time  RR even/NL, speaking in full clear sentences, ambulatory w/ steady gait

## 2022-04-25 ENCOUNTER — OFFICE VISIT (OUTPATIENT)
Dept: SURGERY | Facility: CLINIC | Age: 52
End: 2022-04-25
Payer: COMMERCIAL

## 2022-04-25 VITALS
SYSTOLIC BLOOD PRESSURE: 127 MMHG | DIASTOLIC BLOOD PRESSURE: 87 MMHG | HEIGHT: 66 IN | TEMPERATURE: 97 F | WEIGHT: 155 LBS | HEART RATE: 85 BPM | BODY MASS INDEX: 24.91 KG/M2

## 2022-04-25 DIAGNOSIS — K60.2 ANAL FISSURE: ICD-10-CM

## 2022-04-25 DIAGNOSIS — K64.2 PROLAPSED INTERNAL HEMORRHOIDS, GRADE 3: Primary | ICD-10-CM

## 2022-04-25 DIAGNOSIS — L29.0 PRURITUS ANI: ICD-10-CM

## 2022-04-25 NOTE — PATIENT INSTRUCTIONS
At today's visit, the patient underwent an uncomplicated application of a rubber band and injection of a 5% phenol solution into the base of the rubberbanded hemorrhoid. Today we treated a left anterolateral internal hemorrhoid grade 3    The patient tolerated the procedure well. The patient remained stable throughout the entire procedure within my office. The patient was asked to recover in my office for a few minutes prior to leaving for home. The patient should refrain from extreme sports or athletic activity, including heavy lifting. This completes this patient's rubber band treatments to their internal hemorrhoids. I have no further follow-up scheduled with this patient at this time. This patient can see me on an as-needed basis. This patient should return urgently for any problems or complications related to my surgical intervention.

## 2022-04-25 NOTE — PROCEDURES
Pre op diagnosis: Internal Hemorrhoids    Post op diagnosis: Same    Procedure: Anoscopy with O-ring Rubber Band Ligation of Internal Hemorrhoids    Surgeon: Felix Lay MD    History of present illness: This patient has internal hemorrhoids that are symptomatic    Operative findings: The internal hemorrhoid was successfully ligated in the standard fashion with an O-ring ligator. Operative summary:  The patient was draped and exposed in the usual fashion. A medical assistant was present at all times. External visualization of the perineum and gluteal cleft was performed. Digital exam was performed with a well lubricated examining finger. Diagnostic Anoscopy was performed with lubrication. The anal canal was well visualized. An internal hemorrhoid was identified and well visualized. The internal hemorrhoid was grasped well above the dentate line with the grasper. The internal hemorrhoid was pulled within the O-ring ligator. The O-ring ligator was fired without complication. A 5% phenol solution was injected into the hemorrhoid and at its base. The patient tolerated the procedure and was observed in our office for at least 5 minutes prior to discharge. All findings are listed in the physical exam section of this note.

## 2022-09-12 ENCOUNTER — HOSPITAL ENCOUNTER (OUTPATIENT)
Age: 52
Discharge: HOME | End: 2022-09-12
Payer: COMMERCIAL

## 2022-09-12 ENCOUNTER — APPOINTMENT (OUTPATIENT)
Dept: GENERAL RADIOLOGY | Age: 52
End: 2022-09-12
Attending: NURSE PRACTITIONER
Payer: COMMERCIAL

## 2022-09-12 VITALS
BODY MASS INDEX: 24 KG/M2 | HEART RATE: 102 BPM | OXYGEN SATURATION: 96 % | TEMPERATURE: 99 F | DIASTOLIC BLOOD PRESSURE: 82 MMHG | SYSTOLIC BLOOD PRESSURE: 136 MMHG | RESPIRATION RATE: 22 BRPM | WEIGHT: 151 LBS

## 2022-09-12 DIAGNOSIS — H10.33 ACUTE CONJUNCTIVITIS OF BOTH EYES, UNSPECIFIED ACUTE CONJUNCTIVITIS TYPE: ICD-10-CM

## 2022-09-12 DIAGNOSIS — J06.9 UPPER RESPIRATORY TRACT INFECTION, UNSPECIFIED TYPE: ICD-10-CM

## 2022-09-12 DIAGNOSIS — R05.9 COUGH, UNSPECIFIED TYPE: ICD-10-CM

## 2022-09-12 DIAGNOSIS — J02.0 STREPTOCOCCAL SORE THROAT: Primary | ICD-10-CM

## 2022-09-12 LAB
S PYO AG THROAT QL: POSITIVE
SARS-COV-2 RNA RESP QL NAA+PROBE: NOT DETECTED

## 2022-09-12 PROCEDURE — 99213 OFFICE O/P EST LOW 20 MIN: CPT | Performed by: NURSE PRACTITIONER

## 2022-09-12 PROCEDURE — 87880 STREP A ASSAY W/OPTIC: CPT | Performed by: NURSE PRACTITIONER

## 2022-09-12 PROCEDURE — U0002 COVID-19 LAB TEST NON-CDC: HCPCS | Performed by: NURSE PRACTITIONER

## 2022-09-12 RX ORDER — ALBUTEROL SULFATE 90 UG/1
2 AEROSOL, METERED RESPIRATORY (INHALATION) EVERY 4 HOURS PRN
Qty: 1 EACH | Refills: 0 | Status: SHIPPED | OUTPATIENT
Start: 2022-09-12 | End: 2022-10-12

## 2022-09-12 RX ORDER — OFLOXACIN 3 MG/ML
2 SOLUTION/ DROPS OPHTHALMIC 4 TIMES DAILY
Qty: 10 ML | Refills: 0 | Status: SHIPPED | OUTPATIENT
Start: 2022-09-12 | End: 2022-09-19

## 2022-09-12 RX ORDER — OLOPATADINE HYDROCHLORIDE 2 MG/ML
1 SOLUTION/ DROPS OPHTHALMIC 2 TIMES DAILY PRN
Qty: 2.5 ML | Refills: 0 | Status: SHIPPED | OUTPATIENT
Start: 2022-09-12

## 2022-09-12 RX ORDER — AMOXICILLIN AND CLAVULANATE POTASSIUM 875; 125 MG/1; MG/1
1 TABLET, FILM COATED ORAL 2 TIMES DAILY
Qty: 20 TABLET | Refills: 0 | Status: SHIPPED | OUTPATIENT
Start: 2022-09-12 | End: 2022-09-22

## 2023-08-28 ENCOUNTER — OFFICE VISIT (OUTPATIENT)
Dept: ENDOCRINOLOGY CLINIC | Facility: CLINIC | Age: 53
End: 2023-08-28

## 2023-08-28 VITALS
HEART RATE: 74 BPM | BODY MASS INDEX: 25.23 KG/M2 | SYSTOLIC BLOOD PRESSURE: 94 MMHG | HEIGHT: 66 IN | DIASTOLIC BLOOD PRESSURE: 71 MMHG | WEIGHT: 157 LBS

## 2023-08-28 DIAGNOSIS — E11.65 TYPE 2 DIABETES MELLITUS WITH HYPERGLYCEMIA, WITHOUT LONG-TERM CURRENT USE OF INSULIN (HCC): Primary | ICD-10-CM

## 2023-08-28 DIAGNOSIS — I48.0 PAROXYSMAL ATRIAL FIBRILLATION (HCC): ICD-10-CM

## 2023-08-28 LAB
GLUCOSE BLOOD: 237
TEST STRIP LOT #: NORMAL NUMERIC

## 2023-08-28 PROCEDURE — 82947 ASSAY GLUCOSE BLOOD QUANT: CPT | Performed by: INTERNAL MEDICINE

## 2023-08-28 PROCEDURE — 3078F DIAST BP <80 MM HG: CPT | Performed by: INTERNAL MEDICINE

## 2023-08-28 PROCEDURE — 3008F BODY MASS INDEX DOCD: CPT | Performed by: INTERNAL MEDICINE

## 2023-08-28 PROCEDURE — 99214 OFFICE O/P EST MOD 30 MIN: CPT | Performed by: INTERNAL MEDICINE

## 2023-08-28 PROCEDURE — 3074F SYST BP LT 130 MM HG: CPT | Performed by: INTERNAL MEDICINE

## 2023-08-28 RX ORDER — DULAGLUTIDE 0.75 MG/.5ML
0.75 INJECTION, SOLUTION SUBCUTANEOUS WEEKLY
Qty: 2 ML | Refills: 0 | Status: SHIPPED | OUTPATIENT
Start: 2023-08-28 | End: 2023-09-27

## 2023-08-28 RX ORDER — METOPROLOL SUCCINATE 25 MG/1
25 TABLET, EXTENDED RELEASE ORAL 2 TIMES DAILY
Qty: 60 TABLET | Refills: 0 | Status: SHIPPED | OUTPATIENT
Start: 2023-08-28 | End: 2023-09-27

## 2023-08-28 NOTE — PROGRESS NOTES
Name: Marguerite Estes  Date: 8/28/23    Referring Physician: referred by his wife    HISTORY OF PRESENT ILLNESS   Marguerite Estes is a 48year old male who presents for evaluation and management of uncontrolled T2D. He was diagnosed about 6-8 years. He was diagnosed with regular blood tests. He had been on metformin 1g (IR) but did not tolerate these well, and had diarrhea. At the last initial visit, I had started patient on Januvia 100mg and he had taken this for some days and then stopped this because he felt that it was not working well for him. Blood Glucose Today: 237  HbA1C or glycohemoglobin was 12.0 on 7/05/23 (and it was 10.1 on 1/27/21)  Type 1 or Type 2?: Type 2  Checking 1 times per day- averaging about 230s, 2 hours after eating  Medications for DM nothing   Episodes of hypoglycemia: none    Dietary compliance: average  Breakfast- skips  Coffee- with 3 packets of sugar  Lunch- skips usually  Dinner- vegetables stir-adam and chicken    Exercise: used to walk in the summer, but doesn't walk now due to the weather    Polyuria/polydipsia: none    Blurred vision: yes    Flu Vaccine This Season: does not usually get one    REVIEW OF SYSTEMS  Eyes: Diabetic retinopathy present: mild DR            Most recent visit to eye doctor in last 12 months: recently    CV: Cardiovascular disease present: none         Hypertension present: none         Hyperlipidemia present: none         Peripheral Vascular Disease present: none    : Nephropathy present: unknown    Neuro: Neuropathy present: none    Skin: Infection or ulceration: none    Osteoporosis: none    Thyroid disease: none    Medications:     Current Outpatient Medications:     metFORMIN HCl  MG Oral Tablet 24 Hr, Take 2 tablets (1,000 mg total) by mouth 2 (two) times daily with meals. , Disp: 120 tablet, Rfl: 1    Olopatadine HCl 0.2 % Ophthalmic Solution, Apply 1 drop to eye 2 (two) times daily as needed (eye itching). , Disp: 2.5 mL, Rfl: 0 metoprolol succinate 25 MG Oral Tablet 24 Hr, Take 1 tablet (25 mg total) by mouth 2 (two) times a day., Disp: 60 tablet, Rfl: 3    aspirin 81 MG Oral Tab EC, Take 1 tablet (81 mg total) by mouth daily. , Disp: 90 tablet, Rfl: 3    atorvastatin 20 MG Oral Tab, Take 1 tablet (20 mg total) by mouth nightly., Disp: 90 tablet, Rfl: 1    SITagliptin Phosphate (JANUVIA) 100 MG Oral Tab, Take 1 tablet (100 mg total) by mouth daily. (Patient not taking: Reported on 8/28/2023), Disp: 90 tablet, Rfl: 1   Patient not taking this either    Allergies:   No Known Allergies    Social History:   Social History     Socioeconomic History    Marital status:    Tobacco Use    Smoking status: Never    Smokeless tobacco: Never   Vaping Use    Vaping Use: Never used   Substance and Sexual Activity    Alcohol use: No     Alcohol/week: 0.0 standard drinks of alcohol    Drug use: No       Medical History:   Past Medical History:   Diagnosis Date    Balanitis     Constipation     Hemorrhoids     Other and unspecified hyperlipidemia     Type II or unspecified type diabetes mellitus without mention of complication, not stated as uncontrolled        Surgical history:   Past Surgical History:   Procedure Laterality Date    OTHER SURGICAL HISTORY      Cyst on face on left side         PHYSICAL EXAM   08/28/23  1434   BP: 94/71   Pulse: 74   Weight: 157 lb (71.2 kg)   Height: 5' 6\" (1.676 m)       General Appearance:  alert, well developed, in no acute distress  Eyes:  normal conjunctivae, sclera. , normal sclera and normal pupils  Psychiatric:  oriented to time, self, and place  Nutritional:  no abnormal weight gain or loss    Lab Data:   Lab Results   Component Value Date     02/23/2021    A1C 10.6 (H) 02/23/2021     Lab Results   Component Value Date     (H) 04/18/2022    BUN 22 (H) 04/18/2022    BUNCREA 16.9 03/01/2021    CREATSERUM 0.95 04/18/2022    ANIONGAP 3 04/18/2022    GFR >59 05/29/2009    GFRNAA 93 04/18/2022 GFRAA 107 04/18/2022    CA 8.8 04/18/2022    OSMOCALC 298 (H) 04/18/2022    ALKPHO 94 04/18/2022    AST 26 04/18/2022    ALT 38 04/18/2022    BILT 0.8 04/18/2022    TP 7.8 04/18/2022    ALB 3.7 04/18/2022    GLOBULIN 4.1 04/18/2022    AGRATIO 1.4 10/06/2014     (L) 04/18/2022    K 4.2 04/18/2022     04/18/2022    CO2 26.0 04/18/2022     Lab Results   Component Value Date    CHOLEST 126.00 02/23/2021    TRIG 220.00 (H) 02/23/2021    HDL 37 (L) 02/23/2021    LDL 45 02/23/2021    VLDL 44 (H) 02/23/2021    NONHDLC 159 (H) 01/27/2021     Lab Results   Component Value Date    MALBP 0.56 01/27/2021    CREUR 75.30 01/27/2021       ASSESSMENT/PLAN:    This is a 52year-old man here for evaluation and management of uncontrolled type 2 diabetes. We discussed the ABCs of DM.     1.) Hyperglycemia Management- We discussed the importance of glycemic control to prevent complications of diabetes. We discussed the importance of SBGM. I offered and provided patient education materials and offered a blood glucose log book. - I explained to him that he should check his blood sugars twice a day, fasting and two hours after any meal  - Start Trulicity 4.81QS qweekly and will increase to 1.5mg after 30 days  - He will send us his blood sugars if <70 or >300    2.) Management of Diabetic Complications- We discussed the complications of diabetes include retinopathy, neuropathy, nephropathy and cardiovascular disease.   - no neuropathy  - Ophtho- up to date, some DR  - CAD- none  - MAC- will check now  - Lipid panel- will check now  - CMP- will check now  - Vaccines- he will not get    3.) Lifestyle Management for Diabetes- We discussed importance of a low CHO diet, and recommend 45gm per meal or 135gm per day. We discussed the importance of trying to follow a Mediterranean diet, with an emphasis on vegetables at every meal, with lots whole grains, and protein from either plant-based sources, or poultry and fish.    - He will try and exercise and eat healthy    Return to clinic in 6 weeks    Prior to this encounter, I spent over 15 minutes with preparing for the visit, including reviewing documents from other specialties as well as from PCP and going over test results. During the face to face encounter, I spent an additional 15 minutes which were determined for follow-up. Greater than 50% of the time was spent in counseling, anticipatory guidance, and coordination of care. Patient concerns were answered to the best of my knowledge. 8/28/23  Barby Ospina MD

## 2023-09-06 NOTE — PROGRESS NOTES
02/28/21 1332 02/28/21 1334 02/28/21 1336   Vital Signs   Pulse (!) 136 116 (!) 131   BP (!) 125/103 95/65 (!) 89/57   MAP (mmHg) 108 72 65   BP Location Left arm Left arm Left arm   BP Method Automatic Automatic Automatic   Patient Position Lying Sitti [No Acute Distress] : no acute distress [Normal Appearance] : normal appearance [Normal Rate/Rhythm] : normal rate/rhythm [Normal S1, S2] : normal s1, s2 [No Murmurs] : no murmurs [No Acc Muscle Use] : no acc muscle use [Normal Rhythm and Effort] : normal rhythm and effort [Normal to Percussion] : normal to percussion [No Abnormalities] : no abnormalities [Normal Gait] : normal gait [No Clubbing] : no clubbing [No Cyanosis] : no cyanosis [TextBox_68] : decreased aiar entry bilat [TextBox_105] : 1 plus edema with stasis dermatidis

## 2023-09-08 ENCOUNTER — TELEPHONE (OUTPATIENT)
Dept: ENDOCRINOLOGY CLINIC | Facility: CLINIC | Age: 53
End: 2023-09-08

## 2023-10-09 NOTE — TELEPHONE ENCOUNTER
LOV 08/28/23. RTC 6 weeks. No f/u. Called to schedule. Patient reports he will call back to schedule apt. Pended 3 month supply.

## 2023-10-11 RX ORDER — METOPROLOL SUCCINATE 25 MG/1
25 TABLET, EXTENDED RELEASE ORAL 2 TIMES DAILY
Qty: 60 TABLET | Refills: 3 | Status: SHIPPED | OUTPATIENT
Start: 2023-10-11

## 2023-12-25 ENCOUNTER — HOSPITAL ENCOUNTER (OUTPATIENT)
Age: 53
Discharge: HOME | End: 2023-12-25
Payer: COMMERCIAL

## 2023-12-25 VITALS
OXYGEN SATURATION: 96 % | HEART RATE: 120 BPM | DIASTOLIC BLOOD PRESSURE: 96 MMHG | TEMPERATURE: 100 F | SYSTOLIC BLOOD PRESSURE: 139 MMHG | RESPIRATION RATE: 20 BRPM

## 2023-12-25 DIAGNOSIS — R05.1 ACUTE COUGH: Primary | ICD-10-CM

## 2023-12-25 DIAGNOSIS — J11.1 INFLUENZA-LIKE ILLNESS: ICD-10-CM

## 2023-12-25 LAB
POCT INFLUENZA A: NEGATIVE
POCT INFLUENZA B: NEGATIVE
S PYO AG THROAT QL: NEGATIVE
SARS-COV-2 RNA RESP QL NAA+PROBE: NOT DETECTED

## 2023-12-25 PROCEDURE — U0002 COVID-19 LAB TEST NON-CDC: HCPCS | Performed by: PHYSICIAN ASSISTANT

## 2023-12-25 PROCEDURE — 87880 STREP A ASSAY W/OPTIC: CPT | Performed by: PHYSICIAN ASSISTANT

## 2023-12-25 PROCEDURE — 87502 INFLUENZA DNA AMP PROBE: CPT | Performed by: PHYSICIAN ASSISTANT

## 2023-12-25 PROCEDURE — 99214 OFFICE O/P EST MOD 30 MIN: CPT | Performed by: PHYSICIAN ASSISTANT

## 2023-12-25 RX ORDER — OXYMETAZOLINE HYDROCHLORIDE 0.05 G/100ML
1 SPRAY NASAL EVERY 4 HOURS PRN
Qty: 15 ML | Refills: 0 | Status: SHIPPED | OUTPATIENT
Start: 2023-12-25 | End: 2024-01-24

## 2023-12-25 RX ORDER — BENZONATATE 100 MG/1
100 CAPSULE ORAL 3 TIMES DAILY PRN
Qty: 30 CAPSULE | Refills: 0 | Status: SHIPPED | OUTPATIENT
Start: 2023-12-25 | End: 2024-01-24

## 2023-12-25 RX ORDER — PSEUDOEPHEDRINE HCL 30 MG
30 TABLET ORAL EVERY 4 HOURS PRN
Qty: 36 TABLET | Refills: 0 | Status: SHIPPED | OUTPATIENT
Start: 2023-12-25 | End: 2024-01-24

## 2023-12-25 RX ORDER — ALBUTEROL SULFATE 90 UG/1
2 AEROSOL, METERED RESPIRATORY (INHALATION) EVERY 4 HOURS PRN
Qty: 1 EACH | Refills: 0 | Status: SHIPPED | OUTPATIENT
Start: 2023-12-25 | End: 2024-01-24

## 2024-10-10 ENCOUNTER — OFFICE VISIT (OUTPATIENT)
Dept: ENDOCRINOLOGY CLINIC | Facility: CLINIC | Age: 54
End: 2024-10-10
Payer: COMMERCIAL

## 2024-10-10 VITALS
HEART RATE: 88 BPM | DIASTOLIC BLOOD PRESSURE: 80 MMHG | WEIGHT: 157 LBS | BODY MASS INDEX: 25.23 KG/M2 | SYSTOLIC BLOOD PRESSURE: 133 MMHG | HEIGHT: 66 IN

## 2024-10-10 DIAGNOSIS — E78.5 DYSLIPIDEMIA: ICD-10-CM

## 2024-10-10 DIAGNOSIS — E11.65 TYPE 2 DIABETES MELLITUS WITH HYPERGLYCEMIA, WITHOUT LONG-TERM CURRENT USE OF INSULIN (HCC): Primary | ICD-10-CM

## 2024-10-10 LAB
GLUCOSE BLOOD: 222
HEMOGLOBIN A1C: 10.8 % (ref 4.3–5.6)
TEST STRIP LOT #: NORMAL NUMERIC

## 2024-10-10 PROCEDURE — 82947 ASSAY GLUCOSE BLOOD QUANT: CPT | Performed by: INTERNAL MEDICINE

## 2024-10-10 PROCEDURE — 3079F DIAST BP 80-89 MM HG: CPT | Performed by: INTERNAL MEDICINE

## 2024-10-10 PROCEDURE — 3046F HEMOGLOBIN A1C LEVEL >9.0%: CPT | Performed by: INTERNAL MEDICINE

## 2024-10-10 PROCEDURE — 83036 HEMOGLOBIN GLYCOSYLATED A1C: CPT | Performed by: INTERNAL MEDICINE

## 2024-10-10 PROCEDURE — 3008F BODY MASS INDEX DOCD: CPT | Performed by: INTERNAL MEDICINE

## 2024-10-10 PROCEDURE — 3075F SYST BP GE 130 - 139MM HG: CPT | Performed by: INTERNAL MEDICINE

## 2024-10-10 PROCEDURE — 99214 OFFICE O/P EST MOD 30 MIN: CPT | Performed by: INTERNAL MEDICINE

## 2024-10-10 RX ORDER — DULAGLUTIDE 0.75 MG/.5ML
0.75 INJECTION, SOLUTION SUBCUTANEOUS WEEKLY
Qty: 2 ML | Refills: 0 | Status: SHIPPED | OUTPATIENT
Start: 2024-10-10 | End: 2024-11-09

## 2024-10-10 NOTE — PROGRESS NOTES
Name: Sahil Munoz  Date: 10/10/24    Referring Physician: referred by his wife    HISTORY OF PRESENT ILLNESS   Sahil Munoz is a 54 year old male who presents for evaluation and management of uncontrolled T2D. He was diagnosed about 6-8 years. He was diagnosed with regular blood tests. He had been on metformin 1g (IR) but did not tolerate these well, and had diarrhea.     At the initial visit, I had started patient on Januvia 100mg and he had taken this for some days and then stopped this because he felt that it was not working well for him.     At the last visit, I had tried to start him on Trulicity, but he did not feel that it was working and then stopped it.    Blood Glucose Today: 222  HbA1C or glycohemoglobin is 10.8 today (and it was 12.0 on 7/05/23 (and it was 10.1 on 1/27/21)  Type 1 or Type 2?: Type 2  Checking 1 times per day- averaging about 220  Medications for DM: metformin ER 1000mg once a day  Episodes of hypoglycemia: none    Dietary compliance: average  Breakfast- skips  Coffee- with 3 packets of sugar  Lunch- skips usually  Dinner- vegetables stir-adam and chicken    Exercise: used to walk in the summer, but doesn't walk now due to the weather    Polyuria/polydipsia: none    Blurred vision: yes    Flu Vaccine This Season: does not usually get one    REVIEW OF SYSTEMS  Eyes: Diabetic retinopathy present: mild DR            Most recent visit to eye doctor in last 12 months: recently    CV: Cardiovascular disease present: none         Hypertension present: none         Hyperlipidemia present: not at goal, just started statin (5/16/24)         Peripheral Vascular Disease present: none    : Nephropathy present: creatinine- 0.70 (5/16/24)    Neuro: Neuropathy present: none    Skin: Infection or ulceration: none    Osteoporosis: none    Thyroid disease: none    Medications:     Current Outpatient Medications:     metFORMIN HCl  MG Oral Tablet 24 Hr, Take 2 tablets (1,000 mg total) by mouth 2  (two) times daily with meals., Disp: 120 tablet, Rfl: 1    metoprolol succinate ER 25 MG Oral Tablet 24 Hr, Take 1 tablet (25 mg total) by mouth 2 (two) times daily., Disp: 60 tablet, Rfl: 3    Olopatadine HCl 0.2 % Ophthalmic Solution, Apply 1 drop to eye 2 (two) times daily as needed (eye itching)., Disp: 2.5 mL, Rfl: 0    aspirin 81 MG Oral Tab EC, Take 1 tablet (81 mg total) by mouth daily., Disp: 90 tablet, Rfl: 3    atorvastatin 20 MG Oral Tab, Take 1 tablet (20 mg total) by mouth nightly., Disp: 90 tablet, Rfl: 1   Patient not taking this either    Allergies:   No Known Allergies    Social History:   Social History     Socioeconomic History    Marital status:    Tobacco Use    Smoking status: Never    Smokeless tobacco: Never   Vaping Use    Vaping status: Never Used   Substance and Sexual Activity    Alcohol use: No     Alcohol/week: 0.0 standard drinks of alcohol    Drug use: No       Medical History:   Past Medical History:    Balanitis    Constipation    Hemorrhoids    Other and unspecified hyperlipidemia    Type II or unspecified type diabetes mellitus without mention of complication, not stated as uncontrolled       Surgical history:   Past Surgical History:   Procedure Laterality Date    Other surgical history      Cyst on face on left side         PHYSICAL EXAM  Vitals:    10/10/24 0757   BP: 133/80   Pulse: 88   Weight: 157 lb (71.2 kg)   Height: 5' 6\" (1.676 m)         General Appearance:  alert, well developed, in no acute distress  Eyes:  normal conjunctivae, sclera., normal sclera and normal pupils  Psychiatric:  oriented to time, self, and place  Nutritional:  no abnormal weight gain or loss  Bilateral barefoot skin diabetic exam is normal, visualized feet and the appearance is normal.  Bilateral monofilament/sensation of both feet is normal.  Pulsation pedal pulse exam of both lower legs/feet is normal as well.        Lab Data:   Lab Results   Component Value Date     02/23/2021     A1C 10.6 (H) 02/23/2021     Lab Results   Component Value Date     (H) 04/18/2022    BUN 22 (H) 04/18/2022    BUNCREA 16.9 03/01/2021    CREATSERUM 0.95 04/18/2022    ANIONGAP 3 04/18/2022    GFR >59 05/29/2009    GFRNAA 93 04/18/2022    GFRAA 107 04/18/2022    CA 8.8 04/18/2022    OSMOCALC 298 (H) 04/18/2022    ALKPHO 94 04/18/2022    AST 26 04/18/2022    ALT 38 04/18/2022    BILT 0.8 04/18/2022    TP 7.8 04/18/2022    ALB 3.7 04/18/2022    GLOBULIN 4.1 04/18/2022    AGRATIO 1.4 10/06/2014     (L) 04/18/2022    K 4.2 04/18/2022     04/18/2022    CO2 26.0 04/18/2022     Lab Results   Component Value Date    CHOLEST 126.00 02/23/2021    TRIG 220.00 (H) 02/23/2021    HDL 37 (L) 02/23/2021    LDL 45 02/23/2021    VLDL 44 (H) 02/23/2021    NONHDLC 159 (H) 01/27/2021     Lab Results   Component Value Date    MALBP 0.56 01/27/2021    CREUR 75.30 01/27/2021       ASSESSMENT/PLAN:    This is a 54 year-old man here for evaluation and management of uncontrolled type 2 diabetes. We discussed the ABCs of DM.     1.) Hyperglycemia Management- We discussed the importance of glycemic control to prevent complications of diabetes. We discussed the importance of SBGM. I offered and provided patient education materials and offered a blood glucose log book.   - I explained to him that he should check his blood sugars twice a day, fasting and two hours after any meal  - Start Trulicity 0.75mg qweekly and will increase to 1.5mg after 30 days  - Continue metformin ER 1g PO qAM  - He will send us his blood sugars if <70 or >300    2.) Management of Diabetic Complications- We discussed the complications of diabetes include retinopathy, neuropathy, nephropathy and cardiovascular disease.   - no neuropathy  - Ophtho- up to date, some DR  - CAD- none  - MAC- will check in 3 months  - Lipid panel- will check in 3 months  - CMP- will check in 3 months  - Vaccines- he will not get    3.) Lifestyle Management for Diabetes- We  discussed importance of a low CHO diet, and recommend 45gm per meal or 135gm per day. We discussed the importance of trying to follow a Mediterranean diet, with an emphasis on vegetables at every meal, with lots whole grains, and protein from either plant-based sources, or poultry and fish.   - He will try and exercise and eat healthy    Return to clinic in 3 months with VV    Prior to this encounter, I spent over 15 minutes with preparing for the visit, including reviewing documents from other specialties as well as from PCP and going over test results. During the face to face encounter, I spent an additional 15 minutes which were determined for follow-up. Greater than 50% of the time was spent in counseling, anticipatory guidance, and coordination of care. Patient concerns were answered to the best of my knowledge.     10/10/24  Barby Julien MD

## 2024-11-09 DIAGNOSIS — E11.65 TYPE 2 DIABETES MELLITUS WITH HYPERGLYCEMIA, WITHOUT LONG-TERM CURRENT USE OF INSULIN (HCC): ICD-10-CM

## 2024-11-09 NOTE — TELEPHONE ENCOUNTER
Endocrine Refill protocol for oral and injectable diabetic medications    Protocol Criteria:  FAILED  Reason: Elevated A1C    If all below requirements are met, send a 90-day supply with 1 refill per provider protocol.    Verify appointment with Endocrinology completed in the last 6 months or scheduled in the next 3 months.  Verify A1C has been completed within the last 6 months and is below 8.5%     Last completed office visit: 10/10/2024 Barby Julien MD   Next scheduled Follow up:   Future Appointments   Date Time Provider Department Center   1/14/2025  8:15 AM Barby Julien MD [facility]      Last A1c result: Last A1c value was 10.8% done 10/10/2024.

## 2024-11-12 RX ORDER — DULAGLUTIDE 1.5 MG/.5ML
1.5 INJECTION, SOLUTION SUBCUTANEOUS WEEKLY
Qty: 6 ML | Refills: 1 | OUTPATIENT
Start: 2024-11-12

## 2024-11-12 RX ORDER — DULAGLUTIDE 1.5 MG/.5ML
1.5 INJECTION, SOLUTION SUBCUTANEOUS WEEKLY
Qty: 6 ML | Refills: 0 | Status: SHIPPED | OUTPATIENT
Start: 2024-11-12

## 2024-11-12 NOTE — TELEPHONE ENCOUNTER
Per LOV dtd 10/10/24:  - Start Trulicity 0.75mg qweekly and will increase to 1.5mg after 30 days     RX sent for 1.5mg trulicity  Per chart review:  your benefit plan and received approval of prior authorization for the medication Trulicity until 9/12/2024.       PA completed via Epic:  Authorized from November 12, 2024 to November 12, 2027

## 2025-01-08 ENCOUNTER — LAB ENCOUNTER (OUTPATIENT)
Dept: LAB | Age: 55
End: 2025-01-08
Attending: INTERNAL MEDICINE
Payer: COMMERCIAL

## 2025-01-08 DIAGNOSIS — E78.5 DYSLIPIDEMIA: ICD-10-CM

## 2025-01-08 DIAGNOSIS — E11.65 TYPE 2 DIABETES MELLITUS WITH HYPERGLYCEMIA, WITHOUT LONG-TERM CURRENT USE OF INSULIN (HCC): ICD-10-CM

## 2025-01-08 LAB
ALBUMIN SERPL-MCNC: 4.6 G/DL (ref 3.2–4.8)
ALBUMIN/GLOB SERPL: 1.5 {RATIO} (ref 1–2)
ALP LIVER SERPL-CCNC: 81 U/L
ALT SERPL-CCNC: 30 U/L
ANION GAP SERPL CALC-SCNC: 6 MMOL/L (ref 0–18)
AST SERPL-CCNC: 22 U/L (ref ?–34)
BILIRUB SERPL-MCNC: 0.7 MG/DL (ref 0.3–1.2)
BUN BLD-MCNC: 20 MG/DL (ref 9–23)
BUN/CREAT SERPL: 20.2 (ref 10–20)
CALCIUM BLD-MCNC: 9.5 MG/DL (ref 8.7–10.4)
CHLORIDE SERPL-SCNC: 108 MMOL/L (ref 98–112)
CHOLEST SERPL-MCNC: 142 MG/DL (ref ?–200)
CO2 SERPL-SCNC: 27 MMOL/L (ref 21–32)
CREAT BLD-MCNC: 0.99 MG/DL
CREAT UR-SCNC: 240.2 MG/DL
EGFRCR SERPLBLD CKD-EPI 2021: 92 ML/MIN/1.73M2 (ref 60–?)
EST. AVERAGE GLUCOSE BLD GHB EST-MCNC: 220 MG/DL (ref 68–126)
FASTING PATIENT LIPID ANSWER: YES
FASTING STATUS PATIENT QL REPORTED: YES
GLOBULIN PLAS-MCNC: 3.1 G/DL (ref 2–3.5)
GLUCOSE BLD-MCNC: 207 MG/DL (ref 70–99)
HBA1C MFR BLD: 9.3 % (ref ?–5.7)
HDLC SERPL-MCNC: 32 MG/DL (ref 40–59)
LDLC SERPL CALC-MCNC: 75 MG/DL (ref ?–100)
MICROALBUMIN UR-MCNC: 2.1 MG/DL
MICROALBUMIN/CREAT 24H UR-RTO: 8.7 UG/MG (ref ?–30)
NONHDLC SERPL-MCNC: 110 MG/DL (ref ?–130)
OSMOLALITY SERPL CALC.SUM OF ELEC: 301 MOSM/KG (ref 275–295)
POTASSIUM SERPL-SCNC: 4.1 MMOL/L (ref 3.5–5.1)
PROT SERPL-MCNC: 7.7 G/DL (ref 5.7–8.2)
SODIUM SERPL-SCNC: 141 MMOL/L (ref 136–145)
TRIGL SERPL-MCNC: 211 MG/DL (ref 30–149)
VLDLC SERPL CALC-MCNC: 33 MG/DL (ref 0–30)

## 2025-01-08 PROCEDURE — 36415 COLL VENOUS BLD VENIPUNCTURE: CPT

## 2025-01-08 PROCEDURE — 83036 HEMOGLOBIN GLYCOSYLATED A1C: CPT

## 2025-01-08 PROCEDURE — 80061 LIPID PANEL: CPT

## 2025-01-08 PROCEDURE — 80053 COMPREHEN METABOLIC PANEL: CPT

## 2025-01-08 PROCEDURE — 82043 UR ALBUMIN QUANTITATIVE: CPT

## 2025-01-08 PROCEDURE — 82570 ASSAY OF URINE CREATININE: CPT

## 2025-01-16 ENCOUNTER — LAB ENCOUNTER (OUTPATIENT)
Dept: LAB | Facility: REFERENCE LAB | Age: 55
End: 2025-01-16
Attending: INTERNAL MEDICINE
Payer: COMMERCIAL

## 2025-01-16 ENCOUNTER — OFFICE VISIT (OUTPATIENT)
Dept: ENDOCRINOLOGY CLINIC | Facility: CLINIC | Age: 55
End: 2025-01-16
Payer: COMMERCIAL

## 2025-01-16 VITALS
DIASTOLIC BLOOD PRESSURE: 73 MMHG | HEIGHT: 66 IN | HEART RATE: 93 BPM | SYSTOLIC BLOOD PRESSURE: 112 MMHG | WEIGHT: 156 LBS | BODY MASS INDEX: 25.07 KG/M2

## 2025-01-16 DIAGNOSIS — E11.65 TYPE 2 DIABETES MELLITUS WITH HYPERGLYCEMIA, WITHOUT LONG-TERM CURRENT USE OF INSULIN (HCC): ICD-10-CM

## 2025-01-16 DIAGNOSIS — E78.5 DYSLIPIDEMIA: Primary | ICD-10-CM

## 2025-01-16 DIAGNOSIS — I10 ESSENTIAL HYPERTENSION: ICD-10-CM

## 2025-01-16 LAB
ALBUMIN SERPL-MCNC: 4.2 G/DL (ref 3.2–4.8)
ALBUMIN/GLOB SERPL: 1.3 {RATIO} (ref 1–2)
ALP LIVER SERPL-CCNC: 80 U/L
ALT SERPL-CCNC: 27 U/L
ANION GAP SERPL CALC-SCNC: 7 MMOL/L (ref 0–18)
AST SERPL-CCNC: 20 U/L (ref ?–34)
BILIRUB SERPL-MCNC: 1.3 MG/DL (ref 0.3–1.2)
BUN BLD-MCNC: 16 MG/DL (ref 9–23)
BUN/CREAT SERPL: 16 (ref 10–20)
CALCIUM BLD-MCNC: 9.5 MG/DL (ref 8.7–10.4)
CHLORIDE SERPL-SCNC: 108 MMOL/L (ref 98–112)
CO2 SERPL-SCNC: 29 MMOL/L (ref 21–32)
CREAT BLD-MCNC: 1 MG/DL
EGFRCR SERPLBLD CKD-EPI 2021: 91 ML/MIN/1.73M2 (ref 60–?)
GLOBULIN PLAS-MCNC: 3.2 G/DL (ref 2–3.5)
GLUCOSE BLD-MCNC: 191 MG/DL (ref 70–99)
GLUCOSE BLOOD: 198
OSMOLALITY SERPL CALC.SUM OF ELEC: 304 MOSM/KG (ref 275–295)
POTASSIUM SERPL-SCNC: 4.4 MMOL/L (ref 3.5–5.1)
PROT SERPL-MCNC: 7.4 G/DL (ref 5.7–8.2)
SODIUM SERPL-SCNC: 144 MMOL/L (ref 136–145)
TEST STRIP LOT #: NORMAL NUMERIC

## 2025-01-16 PROCEDURE — 99214 OFFICE O/P EST MOD 30 MIN: CPT | Performed by: INTERNAL MEDICINE

## 2025-01-16 PROCEDURE — 3074F SYST BP LT 130 MM HG: CPT | Performed by: INTERNAL MEDICINE

## 2025-01-16 PROCEDURE — 3046F HEMOGLOBIN A1C LEVEL >9.0%: CPT | Performed by: INTERNAL MEDICINE

## 2025-01-16 PROCEDURE — 3061F NEG MICROALBUMINURIA REV: CPT | Performed by: INTERNAL MEDICINE

## 2025-01-16 PROCEDURE — 36415 COLL VENOUS BLD VENIPUNCTURE: CPT

## 2025-01-16 PROCEDURE — 84681 ASSAY OF C-PEPTIDE: CPT

## 2025-01-16 PROCEDURE — 80053 COMPREHEN METABOLIC PANEL: CPT

## 2025-01-16 PROCEDURE — 82947 ASSAY GLUCOSE BLOOD QUANT: CPT | Performed by: INTERNAL MEDICINE

## 2025-01-16 PROCEDURE — 3008F BODY MASS INDEX DOCD: CPT | Performed by: INTERNAL MEDICINE

## 2025-01-16 PROCEDURE — 3078F DIAST BP <80 MM HG: CPT | Performed by: INTERNAL MEDICINE

## 2025-01-16 RX ORDER — METFORMIN HYDROCHLORIDE 500 MG/1
1000 TABLET, EXTENDED RELEASE ORAL 2 TIMES DAILY WITH MEALS
Qty: 120 TABLET | Refills: 3 | Status: SHIPPED | OUTPATIENT
Start: 2025-01-16

## 2025-01-16 RX ORDER — DULAGLUTIDE 1.5 MG/.5ML
1.5 INJECTION, SOLUTION SUBCUTANEOUS WEEKLY
Qty: 6 ML | Refills: 1 | Status: CANCELLED
Start: 2025-01-16

## 2025-01-16 RX ORDER — DULAGLUTIDE 3 MG/.5ML
3 INJECTION, SOLUTION SUBCUTANEOUS WEEKLY
Qty: 6 ML | Refills: 3 | Status: SHIPPED | OUTPATIENT
Start: 2025-01-16 | End: 2025-04-16

## 2025-01-16 NOTE — PROGRESS NOTES
Name: Sahil Munoz  Date: 1/16/25    Referring Physician: referred by his wife    HISTORY OF PRESENT ILLNESS   Sahil Munoz is a 54 year old male who presents for evaluation and management of uncontrolled T2D. He was diagnosed about 6-8 years. He was diagnosed with regular blood tests. He had been on metformin 1g (IR) but did not tolerate these well, and had diarrhea.     At the initial visit, I had started patient on Januvia 100mg and he had taken this for some days and then stopped this because he felt that it was not working well for him.     10/10/24: At the last visit, I had tried to start him on Trulicity, but he did not feel that it was working and then stopped it.    01/16/25: At the last visit, I had restarted his Trulicity at 0.75mg qweekly and then increased the dose to 1.5mg qweekly. He does think that it is working for him now. He is also taking the metformin.    Blood Glucose Today: 198  HbA1C or glycohemoglobin is 9.3 on 1/08/25 (and it was 10.8 on 10/10/24 and it was 12.0 on 7/05/23 (and it was 10.1 on 1/27/21)  Type 1 or Type 2?: Type 2  Checking 1 times per day- averaging about the mid 100s  Medications for DM: metformin ER 1000mg once a day; Trulicity 1.5mg qweekly  Episodes of hypoglycemia: none    Dietary compliance: average  Breakfast- skips  Coffee- with 3 packets of sugar  Lunch- skips usually  Dinner- vegetables stir-adam and chicken    Exercise: used to walk in the summer, but doesn't walk now due to the weather    Polyuria/polydipsia: none    Blurred vision: yes    Flu Vaccine This Season: does not usually get one    REVIEW OF SYSTEMS  Eyes: Diabetic retinopathy present: mild DR            Most recent visit to eye doctor in last 12 months: recently    CV: Cardiovascular disease present: none         Hypertension present: none         Hyperlipidemia present: not at goal, TGL elevated, just started statin (1/08/25)         Peripheral Vascular Disease present: none    : Nephropathy present:  MAC: none (1/08/25); creatinine- 0.99     Neuro: Neuropathy present: none    Skin: Infection or ulceration: none    Osteoporosis: none    Thyroid disease: none    Medications:     Current Outpatient Medications:     metFORMIN  MG Oral Tablet 24 Hr, Take 2 tablets (1,000 mg total) by mouth 2 (two) times daily with meals., Disp: 120 tablet, Rfl: 3    Dulaglutide (TRULICITY) 3 MG/0.5ML Subcutaneous Solution Auto-injector, Inject 3 mg into the skin once a week., Disp: 6 mL, Rfl: 3    metoprolol succinate ER 25 MG Oral Tablet 24 Hr, Take 1 tablet (25 mg total) by mouth 2 (two) times daily., Disp: 60 tablet, Rfl: 3    Olopatadine HCl 0.2 % Ophthalmic Solution, Apply 1 drop to eye 2 (two) times daily as needed (eye itching)., Disp: 2.5 mL, Rfl: 0    aspirin 81 MG Oral Tab EC, Take 1 tablet (81 mg total) by mouth daily., Disp: 90 tablet, Rfl: 3    atorvastatin 20 MG Oral Tab, Take 1 tablet (20 mg total) by mouth nightly., Disp: 90 tablet, Rfl: 1       Allergies:   No Known Allergies    Social History:   Social History     Socioeconomic History    Marital status:    Tobacco Use    Smoking status: Never    Smokeless tobacco: Never   Vaping Use    Vaping status: Never Used   Substance and Sexual Activity    Alcohol use: No     Alcohol/week: 0.0 standard drinks of alcohol    Drug use: No       Medical History:   Past Medical History:    Balanitis    Constipation    Hemorrhoids    Other and unspecified hyperlipidemia    Type II or unspecified type diabetes mellitus without mention of complication, not stated as uncontrolled       Surgical history:   Past Surgical History:   Procedure Laterality Date    Other surgical history      Cyst on face on left side         PHYSICAL EXAM  Vitals:    01/16/25 0903   BP: 112/73   Pulse: 93   Weight: 156 lb (70.8 kg)   Height: 5' 6\" (1.676 m)           General Appearance:  alert, well developed, in no acute distress  Eyes:  normal conjunctivae, sclera., normal sclera and normal  pupils  Psychiatric:  oriented to time, self, and place  Nutritional:  no abnormal weight gain or loss  Bilateral barefoot skin diabetic exam is normal, visualized feet and the appearance is normal.  Bilateral monofilament/sensation of both feet is normal.  Pulsation pedal pulse exam of both lower legs/feet is normal as well.        Lab Data:   Lab Results   Component Value Date     (H) 01/08/2025    A1C 9.3 (H) 01/08/2025     Lab Results   Component Value Date     (H) 01/08/2025    BUN 20 01/08/2025    BUNCREA 20.2 (H) 01/08/2025    CREATSERUM 0.99 01/08/2025    ANIONGAP 6 01/08/2025    GFR >59 05/29/2009    GFRNAA 93 04/18/2022    GFRAA 107 04/18/2022    CA 9.5 01/08/2025    OSMOCALC 301 (H) 01/08/2025    ALKPHO 81 01/08/2025    AST 22 01/08/2025    ALT 30 01/08/2025    BILT 0.7 01/08/2025    TP 7.7 01/08/2025    ALB 4.6 01/08/2025    GLOBULIN 3.1 01/08/2025    AGRATIO 1.4 10/06/2014     01/08/2025    K 4.1 01/08/2025     01/08/2025    CO2 27.0 01/08/2025     Lab Results   Component Value Date    CHOLEST 142 01/08/2025    TRIG 211 (H) 01/08/2025    HDL 32 (L) 01/08/2025    LDL 75 01/08/2025    VLDL 33 (H) 01/08/2025    NONHDLC 110 01/08/2025     Lab Results   Component Value Date    MALBP 2.10 01/08/2025    CREUR 240.20 01/08/2025       ASSESSMENT/PLAN:    This is a 54 year-old man here for evaluation and management of uncontrolled type 2 diabetes. We discussed the ABCs of DM.     1.) Hyperglycemia Management- We discussed the importance of glycemic control to prevent complications of diabetes. We discussed the importance of SBGM. I offered and provided patient education materials and offered a blood glucose log book.   - I explained to him that he should check his blood sugars twice a day, fasting and two hours after any meal  - Increase Trulicity from 1.5mg to 3.0mg qweekly  - Continue metformin ER 1g PO qAM  - He will send us his blood sugars if <70 or >300    2.) Management of  Diabetic Complications- We discussed the complications of diabetes include retinopathy, neuropathy, nephropathy and cardiovascular disease.   - no neuropathy  - Ophtho- up to date, some DR  - CAD- none  - MAC- will check in 3 months  - Lipid panel- will check in 3 months  - CMP- will check in 3 months  - Vaccines- he will not get    3.) Lifestyle Management for Diabetes- We discussed importance of a low CHO diet, and recommend 45gm per meal or 135gm per day. We discussed the importance of trying to follow a Mediterranean diet, with an emphasis on vegetables at every meal, with lots whole grains, and protein from either plant-based sources, or poultry and fish.   - He will try and exercise and eat healthy    Return to clinic in 3 months     Prior to this encounter, I spent over 15 minutes with preparing for the visit, including reviewing documents from other specialties as well as from PCP and going over test results. During the face to face encounter, I spent an additional 15 minutes which were determined for follow-up. Greater than 50% of the time was spent in counseling, anticipatory guidance, and coordination of care. Patient concerns were answered to the best of my knowledge.     1/16/25  Barby Julien MD

## 2025-01-19 LAB — C-PEPTIDE: 2.6 NG/ML

## 2025-05-05 ENCOUNTER — PATIENT MESSAGE (OUTPATIENT)
Dept: ENDOCRINOLOGY CLINIC | Facility: CLINIC | Age: 55
End: 2025-05-05

## 2025-05-05 RX ORDER — DULAGLUTIDE 3 MG/.5ML
3 INJECTION, SOLUTION SUBCUTANEOUS WEEKLY
Qty: 6 ML | Refills: 0 | Status: SHIPPED | OUTPATIENT
Start: 2025-05-05

## 2025-06-08 DIAGNOSIS — E11.65 TYPE 2 DIABETES MELLITUS WITH HYPERGLYCEMIA, WITHOUT LONG-TERM CURRENT USE OF INSULIN (HCC): ICD-10-CM

## 2025-06-09 RX ORDER — METFORMIN HYDROCHLORIDE 500 MG/1
1000 TABLET, EXTENDED RELEASE ORAL 2 TIMES DAILY WITH MEALS
Qty: 360 TABLET | Refills: 1 | Status: SHIPPED | OUTPATIENT
Start: 2025-06-09

## 2025-06-09 NOTE — TELEPHONE ENCOUNTER
Endocrine Refill protocol for metformin    Protocol Criteria:  FAILED  Reason: Elevated A1C    If all below requirements are met, send a 90-day supply with 1 refill per provider protocol.     Verify appointment with Endocrinology completed in the last 6 months or scheduled in the next 3 months.  Verify A1C has been completed within the last 6 months and is below 8.5%  Verify last GFR is greater than or equal to 40 in the past 12 months    Last completed office visit:1/16/2025 Barby Julien MD   Last completed telemed visit: Visit date not found  Next scheduled Follow up:   Future Appointments   Date Time Provider Department Center   8/7/2025 11:40 AM Barby Julien MD [facility]       Last GFR result:    Lab Results   Component Value Date    EGFRCR 91 01/16/2025     Last A1c result: Last A1C result: 9.3% done 1/8/2025.

## (undated) NOTE — LETTER
22    Patient: Mary Kate Urbina  : 1970 Visit date: 2022    Dear  Yolette Reed MD    Thank you for referring Mary Kate Urbina to my practice. Please find my assessment and plan below. Assessment   Prolapsed internal hemorrhoids, grade 3  (primary encounter diagnosis)  Pruritus ani  Anal fissure    Plan   At today's visit, the patient underwent an uncomplicated application of a rubber band and injection of a 5% phenol solution into the base of the rubberbanded hemorrhoid. Today we treated a left anterolateral internal hemorrhoid grade 3    The patient tolerated the procedure well. The patient remained stable throughout the entire procedure within my office. The patient was asked to recover in my office for a few minutes prior to leaving for home. The patient should refrain from extreme sports or athletic activity, including heavy lifting. This completes this patient's rubber band treatments to their internal hemorrhoids. I have no further follow-up scheduled with this patient at this time. This patient can see me on an as-needed basis. This patient should return urgently for any problems or complications related to my surgical intervention.            Sincerely,       Nicola Bryant MD   CC: No Recipients

## (undated) NOTE — LETTER
21    Patient: Shama Decker  : 1970 Visit date: 2021    Dear  Estelle Peralta MD    Thank you for referring Shama Decker to my practice. Please find my assessment and plan below.     Assessment   Prolapsed internal hemorrhoids, grade 3

## (undated) NOTE — LETTER
Date & Time: 4/18/2022, 9:42 PM  Patient: Barb Patterson  Encounter Provider(s):    Miguel A Spencer MD         This certifies that Reece Rinne, a patient at an [facility], am leaving the facility voluntarily and against the advice of my physician. I acknowledge that I have been:    1. informed that my physician believes that I need to receive care here;  2. informed that if I leave, I could become sicker or even die; and  3. provided discharge instructions consistent with my current diagnosis. I hereby release my physician, the facility, and its employees from all responsibility for any ill effects which may result from this action. __________________________________  Patient or authorized caregiver signature    __________________________________  RN signature    If no patient or patient representative signature was obtained, sign below to acknowledge that the form was reviewed with the patient and that the patient refused to sign.     __________________________________  RN signature

## (undated) NOTE — LETTER
22    Patient: Vijaya Orta  : 1970 Visit date: 2022    Dear  Remi Young MD    Thank you for referring Vijaya Orta to my practice. Please find my assessment and plan below.     Assessment   Prolapsed internal hemorrhoids, grade 3  (p

## (undated) NOTE — LETTER
21    Patient: Lee Walter  : 1970 Visit date: 2021    Dear  Ary Rowe MD    Thank you for referring Lee Walter to my practice. Please find my assessment and plan below.        Assessment   Prolapsed internal hemorrhoids, grade 3 and size. There are large grade 3 internal hemorrhoids and 3 classic locations. There is no evidence of any proctitis active Crohn's disease. I discussed with the patient that he does have a healing anterior midline anal fissure.   This is causing him

## (undated) NOTE — LETTER
12/10/21    Patient: Edvin Zarate  : 1970 Visit date: 2021    Dear  Hilary Hankins MD    Thank you for referring Edvin Zarate to my practice. Please find my assessment and plan below.     Assessment   Prolapsed internal hemorrhoids, grade 3  (